# Patient Record
Sex: MALE | Race: WHITE | NOT HISPANIC OR LATINO
[De-identification: names, ages, dates, MRNs, and addresses within clinical notes are randomized per-mention and may not be internally consistent; named-entity substitution may affect disease eponyms.]

---

## 2017-01-19 ENCOUNTER — RX RENEWAL (OUTPATIENT)
Age: 67
End: 2017-01-19

## 2017-02-17 ENCOUNTER — APPOINTMENT (OUTPATIENT)
Dept: FAMILY MEDICINE | Facility: CLINIC | Age: 67
End: 2017-02-17

## 2017-02-17 VITALS
WEIGHT: 260 LBS | OXYGEN SATURATION: 93 % | BODY MASS INDEX: 38.51 KG/M2 | HEART RATE: 70 BPM | HEIGHT: 69 IN | DIASTOLIC BLOOD PRESSURE: 80 MMHG | SYSTOLIC BLOOD PRESSURE: 148 MMHG

## 2017-02-17 VITALS — SYSTOLIC BLOOD PRESSURE: 132 MMHG | DIASTOLIC BLOOD PRESSURE: 80 MMHG

## 2017-03-27 ENCOUNTER — NON-APPOINTMENT (OUTPATIENT)
Age: 67
End: 2017-03-27

## 2017-03-27 ENCOUNTER — APPOINTMENT (OUTPATIENT)
Dept: FAMILY MEDICINE | Facility: CLINIC | Age: 67
End: 2017-03-27

## 2017-03-27 VITALS
OXYGEN SATURATION: 94 % | HEART RATE: 72 BPM | HEIGHT: 69 IN | DIASTOLIC BLOOD PRESSURE: 68 MMHG | SYSTOLIC BLOOD PRESSURE: 132 MMHG | WEIGHT: 256 LBS | BODY MASS INDEX: 37.92 KG/M2

## 2017-03-27 DIAGNOSIS — Z87.898 PERSONAL HISTORY OF OTHER SPECIFIED CONDITIONS: ICD-10-CM

## 2017-03-27 DIAGNOSIS — K08.9 DISORDER OF TEETH AND SUPPORTING STRUCTURES, UNSPECIFIED: ICD-10-CM

## 2017-03-27 DIAGNOSIS — R09.82 POSTNASAL DRIP: ICD-10-CM

## 2017-03-27 LAB
25(OH)D3 SERPL-MCNC: 38
ALBUMIN SERPL ELPH-MCNC: 4.2
ALP BLD-CCNC: 74
ALT SERPL-CCNC: 35
APPEARANCE: CLEAR
AST SERPL-CCNC: 22
BACTERIA: NORMAL
BILIRUBIN URINE: NEGATIVE
BLOOD URINE: NEGATIVE
CALCIUM SERPL-MCNC: 9
CHLORIDE SERPL-SCNC: 102
CHOLEST SERPL-MCNC: 168
CHOLEST/HDLC SERPL: 3.5
CO2 SERPL-SCNC: 29
COLOR: YELLOW
CREAT SERPL-MCNC: 0.85
GLUCOSE QUALITATIVE U: NEGATIVE
GLUCOSE SERPL-MCNC: 106
HBA1C MFR BLD HPLC: 6
HCT VFR BLD CALC: 39.6
HDLC SERPL-MCNC: 48
HGB BLD-MCNC: 13.2
HYALINE CASTS: NORMAL
KETONES URINE: NEGATIVE
LDLC SERPL CALC-MCNC: 61
LEUKOCYTE ESTERASE URINE: NORMAL
NITRITE URINE: NEGATIVE
PH URINE: 6
PLATELET # BLD AUTO: 182
POTASSIUM SERPL-SCNC: 3.6
PROT SERPL-MCNC: 6.8
PROTEIN URINE: NEGATIVE
PSA SERPL-MCNC: 1.6
RED BLOOD CELLS URINE: NORMAL
SODIUM SERPL-SCNC: 141
SPECIFIC GRAVITY URINE: 1.01
SQUAMOUS EPITHELIAL CELLS: NORMAL
TESTOST BND SERPL-MCNC: 34
TESTOST SERPL-MCNC: 170
TRIGL SERPL-MCNC: 295
TSH SERPL-ACNC: 2.41
WBC # FLD AUTO: 4.7
WHITE BLOOD CELLS URINE: NORMAL

## 2017-03-27 RX ORDER — ASCORBIC ACID 500 MG
TABLET ORAL
Refills: 0 | Status: ACTIVE | COMMUNITY

## 2017-03-27 RX ORDER — ASPIRIN 81 MG
81 TABLET, DELAYED RELEASE (ENTERIC COATED) ORAL
Refills: 0 | Status: ACTIVE | COMMUNITY

## 2017-06-26 ENCOUNTER — RX RENEWAL (OUTPATIENT)
Age: 67
End: 2017-06-26

## 2017-06-29 ENCOUNTER — APPOINTMENT (OUTPATIENT)
Dept: FAMILY MEDICINE | Facility: CLINIC | Age: 67
End: 2017-06-29

## 2017-06-29 VITALS
DIASTOLIC BLOOD PRESSURE: 90 MMHG | SYSTOLIC BLOOD PRESSURE: 160 MMHG | BODY MASS INDEX: 38.21 KG/M2 | WEIGHT: 258 LBS | HEIGHT: 69 IN | HEART RATE: 74 BPM | OXYGEN SATURATION: 94 %

## 2017-06-29 DIAGNOSIS — L20.9 ATOPIC DERMATITIS, UNSPECIFIED: ICD-10-CM

## 2017-06-29 DIAGNOSIS — M25.50 PAIN IN UNSPECIFIED JOINT: ICD-10-CM

## 2017-06-29 DIAGNOSIS — M54.2 CERVICALGIA: ICD-10-CM

## 2017-06-29 RX ORDER — BACILLUS COAGULANS/INULIN 1B-250 MG
CAPSULE ORAL
Refills: 0 | Status: ACTIVE | COMMUNITY

## 2017-09-27 ENCOUNTER — RX RENEWAL (OUTPATIENT)
Age: 67
End: 2017-09-27

## 2017-09-29 ENCOUNTER — APPOINTMENT (OUTPATIENT)
Dept: FAMILY MEDICINE | Facility: CLINIC | Age: 67
End: 2017-09-29
Payer: MEDICARE

## 2017-09-29 VITALS
SYSTOLIC BLOOD PRESSURE: 122 MMHG | HEIGHT: 69 IN | DIASTOLIC BLOOD PRESSURE: 80 MMHG | TEMPERATURE: 97.8 F | OXYGEN SATURATION: 96 % | HEART RATE: 60 BPM | WEIGHT: 250 LBS | BODY MASS INDEX: 37.03 KG/M2

## 2017-09-29 DIAGNOSIS — Z23 ENCOUNTER FOR IMMUNIZATION: ICD-10-CM

## 2017-09-29 PROCEDURE — 90662 IIV NO PRSV INCREASED AG IM: CPT

## 2017-09-29 PROCEDURE — G0008: CPT

## 2017-09-29 PROCEDURE — 99214 OFFICE O/P EST MOD 30 MIN: CPT | Mod: 25

## 2017-10-16 ENCOUNTER — RX RENEWAL (OUTPATIENT)
Age: 67
End: 2017-10-16

## 2017-10-30 ENCOUNTER — RX RENEWAL (OUTPATIENT)
Age: 67
End: 2017-10-30

## 2017-10-31 ENCOUNTER — RX RENEWAL (OUTPATIENT)
Age: 67
End: 2017-10-31

## 2018-01-05 ENCOUNTER — APPOINTMENT (OUTPATIENT)
Dept: FAMILY MEDICINE | Facility: CLINIC | Age: 68
End: 2018-01-05
Payer: MEDICARE

## 2018-01-05 VITALS
HEART RATE: 64 BPM | WEIGHT: 235 LBS | BODY MASS INDEX: 34.8 KG/M2 | OXYGEN SATURATION: 98 % | DIASTOLIC BLOOD PRESSURE: 80 MMHG | SYSTOLIC BLOOD PRESSURE: 130 MMHG | HEIGHT: 69 IN

## 2018-01-05 DIAGNOSIS — Z87.09 PERSONAL HISTORY OF OTHER DISEASES OF THE RESPIRATORY SYSTEM: ICD-10-CM

## 2018-01-05 DIAGNOSIS — Z85.820 PERSONAL HISTORY OF MALIGNANT MELANOMA OF SKIN: ICD-10-CM

## 2018-01-05 PROCEDURE — 99214 OFFICE O/P EST MOD 30 MIN: CPT

## 2018-01-05 RX ORDER — CLOTRIMAZOLE AND BETAMETHASONE DIPROPIONATE 10; .5 MG/G; MG/G
1-0.05 CREAM TOPICAL TWICE DAILY
Qty: 1 | Refills: 3 | Status: DISCONTINUED | COMMUNITY
Start: 2017-06-29 | End: 2018-01-05

## 2018-03-19 ENCOUNTER — RX RENEWAL (OUTPATIENT)
Age: 68
End: 2018-03-19

## 2018-03-25 ENCOUNTER — RX RENEWAL (OUTPATIENT)
Age: 68
End: 2018-03-25

## 2018-04-11 ENCOUNTER — FORM ENCOUNTER (OUTPATIENT)
Age: 68
End: 2018-04-11

## 2018-04-11 DIAGNOSIS — M54.16 RADICULOPATHY, LUMBAR REGION: ICD-10-CM

## 2018-04-12 ENCOUNTER — APPOINTMENT (OUTPATIENT)
Dept: RADIOLOGY | Facility: CLINIC | Age: 68
End: 2018-04-12
Payer: MEDICARE

## 2018-04-12 ENCOUNTER — OUTPATIENT (OUTPATIENT)
Dept: OUTPATIENT SERVICES | Facility: HOSPITAL | Age: 68
LOS: 1 days | End: 2018-04-12
Payer: MEDICARE

## 2018-04-12 ENCOUNTER — OUTPATIENT (OUTPATIENT)
Dept: OUTPATIENT SERVICES | Facility: HOSPITAL | Age: 68
LOS: 1 days | End: 2018-04-12

## 2018-04-12 ENCOUNTER — APPOINTMENT (OUTPATIENT)
Dept: FAMILY MEDICINE | Facility: CLINIC | Age: 68
End: 2018-04-12
Payer: MEDICARE

## 2018-04-12 VITALS
OXYGEN SATURATION: 94 % | HEART RATE: 68 BPM | HEIGHT: 69 IN | SYSTOLIC BLOOD PRESSURE: 136 MMHG | WEIGHT: 230 LBS | BODY MASS INDEX: 34.07 KG/M2 | DIASTOLIC BLOOD PRESSURE: 78 MMHG

## 2018-04-12 DIAGNOSIS — M25.552 PAIN IN LEFT HIP: ICD-10-CM

## 2018-04-12 DIAGNOSIS — E55.9 VITAMIN D DEFICIENCY, UNSPECIFIED: ICD-10-CM

## 2018-04-12 DIAGNOSIS — M54.9 DORSALGIA, UNSPECIFIED: ICD-10-CM

## 2018-04-12 DIAGNOSIS — Z00.8 ENCOUNTER FOR OTHER GENERAL EXAMINATION: ICD-10-CM

## 2018-04-12 DIAGNOSIS — M25.551 PAIN IN RIGHT HIP: ICD-10-CM

## 2018-04-12 PROCEDURE — G0444 DEPRESSION SCREEN ANNUAL: CPT

## 2018-04-12 PROCEDURE — G0439: CPT

## 2018-04-12 PROCEDURE — 73502 X-RAY EXAM HIP UNI 2-3 VIEWS: CPT | Mod: 26,RT

## 2018-04-12 PROCEDURE — 72114 X-RAY EXAM L-S SPINE BENDING: CPT | Mod: 26

## 2018-04-12 PROCEDURE — 73502 X-RAY EXAM HIP UNI 2-3 VIEWS: CPT

## 2018-04-12 PROCEDURE — 72114 X-RAY EXAM L-S SPINE BENDING: CPT

## 2018-04-12 RX ORDER — PSYLLIUM HUSK 0.4 G
CAPSULE ORAL
Refills: 0 | Status: COMPLETED | COMMUNITY

## 2018-04-13 PROBLEM — M54.16 LUMBAR RADICULOPATHY, RIGHT: Status: ACTIVE | Noted: 2018-04-13

## 2018-04-17 ENCOUNTER — OTHER (OUTPATIENT)
Age: 68
End: 2018-04-17

## 2018-04-26 ENCOUNTER — FORM ENCOUNTER (OUTPATIENT)
Age: 68
End: 2018-04-26

## 2018-04-26 ENCOUNTER — RX RENEWAL (OUTPATIENT)
Age: 68
End: 2018-04-26

## 2018-04-27 ENCOUNTER — OUTPATIENT (OUTPATIENT)
Dept: OUTPATIENT SERVICES | Facility: HOSPITAL | Age: 68
LOS: 1 days | End: 2018-04-27
Payer: MEDICARE

## 2018-04-27 ENCOUNTER — APPOINTMENT (OUTPATIENT)
Dept: MRI IMAGING | Facility: CLINIC | Age: 68
End: 2018-04-27
Payer: MEDICARE

## 2018-04-27 DIAGNOSIS — Z00.8 ENCOUNTER FOR OTHER GENERAL EXAMINATION: ICD-10-CM

## 2018-04-27 PROCEDURE — 72148 MRI LUMBAR SPINE W/O DYE: CPT

## 2018-04-27 PROCEDURE — 72148 MRI LUMBAR SPINE W/O DYE: CPT | Mod: 26

## 2018-07-17 ENCOUNTER — RX RENEWAL (OUTPATIENT)
Age: 68
End: 2018-07-17

## 2018-08-13 ENCOUNTER — APPOINTMENT (OUTPATIENT)
Dept: FAMILY MEDICINE | Facility: CLINIC | Age: 68
End: 2018-08-13
Payer: MEDICARE

## 2018-08-13 VITALS
WEIGHT: 230 LBS | HEART RATE: 63 BPM | BODY MASS INDEX: 34.07 KG/M2 | OXYGEN SATURATION: 98 % | DIASTOLIC BLOOD PRESSURE: 80 MMHG | HEIGHT: 69 IN | SYSTOLIC BLOOD PRESSURE: 124 MMHG

## 2018-08-13 DIAGNOSIS — M25.532 PAIN IN LEFT WRIST: ICD-10-CM

## 2018-08-13 PROCEDURE — 99214 OFFICE O/P EST MOD 30 MIN: CPT

## 2018-08-13 NOTE — HISTORY OF PRESENT ILLNESS
[FreeTextEntry1] : 4 month f/u HLD/HTN [de-identified] : left wrist pain\par wrist hurt with any movement\par one day he felt a click and it stopped hurting\par \par still trying to limit alcohol\par \par

## 2018-09-21 ENCOUNTER — RX RENEWAL (OUTPATIENT)
Age: 68
End: 2018-09-21

## 2018-10-08 ENCOUNTER — APPOINTMENT (OUTPATIENT)
Dept: FAMILY MEDICINE | Facility: CLINIC | Age: 68
End: 2018-10-08
Payer: MEDICARE

## 2018-10-08 PROCEDURE — G0008: CPT

## 2018-10-08 PROCEDURE — 90662 IIV NO PRSV INCREASED AG IM: CPT

## 2018-10-18 ENCOUNTER — MEDICATION RENEWAL (OUTPATIENT)
Age: 68
End: 2018-10-18

## 2018-10-29 ENCOUNTER — RX RENEWAL (OUTPATIENT)
Age: 68
End: 2018-10-29

## 2018-11-26 ENCOUNTER — APPOINTMENT (OUTPATIENT)
Dept: FAMILY MEDICINE | Facility: CLINIC | Age: 68
End: 2018-11-26
Payer: MEDICARE

## 2018-11-26 VITALS
SYSTOLIC BLOOD PRESSURE: 128 MMHG | HEIGHT: 69 IN | HEART RATE: 65 BPM | DIASTOLIC BLOOD PRESSURE: 80 MMHG | OXYGEN SATURATION: 95 % | WEIGHT: 244 LBS | BODY MASS INDEX: 36.14 KG/M2

## 2018-11-26 VITALS — DIASTOLIC BLOOD PRESSURE: 82 MMHG | SYSTOLIC BLOOD PRESSURE: 136 MMHG

## 2018-11-26 PROCEDURE — G0447 BEHAVIOR COUNSEL OBESITY 15M: CPT | Mod: 59

## 2018-11-26 PROCEDURE — 36415 COLL VENOUS BLD VENIPUNCTURE: CPT

## 2018-11-26 PROCEDURE — G0442 ANNUAL ALCOHOL SCREEN 15 MIN: CPT | Mod: 59

## 2018-11-26 PROCEDURE — 99214 OFFICE O/P EST MOD 30 MIN: CPT | Mod: 25

## 2018-11-26 NOTE — COUNSELING
[ - Annual Alcohol Misuse Screening] : Annual Alcohol Misuse Screening [Weight management counseling provided] : Weight management [Healthy eating counseling provided] : healthy eating [Patient motivation] : Patient motivation [Good understanding] : Patient has a good understanding of lifestyle changes and the steps needed to achieve self management goals [de-identified] : advise decrease alcohol consumption\par given patients medical conditions advise less than 7 alcoholic beverages a week\par needs to focus on losing weight [ - Behavioral Counseling for Obesity (Face-to-Face for 15 Minutes)] : Behavioral Counseling for Obesity (Face-to-Face for 15 Minutes)

## 2018-11-26 NOTE — HISTORY OF PRESENT ILLNESS
[FreeTextEntry1] : follow up on HLD/HTN [de-identified] : stopped taking baby about a month ago\par was taking mobic in october but just for a few days\par he stopped everything at the same time\par he denies any heartburn\par he is not taking omeprazole at this time\par he does admit to increase alcohol consumtpion (more than 14 drinks per week) and blames his weight gain on the alcohol\par

## 2018-12-21 ENCOUNTER — RX RENEWAL (OUTPATIENT)
Age: 68
End: 2018-12-21

## 2019-01-09 LAB
HCT VFR BLD CALC: 40.5
HGB BLD-MCNC: 13.7
PLATELET # BLD AUTO: 193
WBC # FLD AUTO: 6

## 2019-01-14 ENCOUNTER — RX RENEWAL (OUTPATIENT)
Age: 69
End: 2019-01-14

## 2019-01-15 ENCOUNTER — RX RENEWAL (OUTPATIENT)
Age: 69
End: 2019-01-15

## 2019-01-16 ENCOUNTER — MEDICATION RENEWAL (OUTPATIENT)
Age: 69
End: 2019-01-16

## 2019-03-04 ENCOUNTER — APPOINTMENT (OUTPATIENT)
Dept: FAMILY MEDICINE | Facility: CLINIC | Age: 69
End: 2019-03-04
Payer: MEDICARE

## 2019-03-04 VITALS
HEIGHT: 69 IN | SYSTOLIC BLOOD PRESSURE: 132 MMHG | WEIGHT: 245 LBS | BODY MASS INDEX: 36.29 KG/M2 | OXYGEN SATURATION: 97 % | HEART RATE: 69 BPM | DIASTOLIC BLOOD PRESSURE: 78 MMHG

## 2019-03-04 PROCEDURE — G0447 BEHAVIOR COUNSEL OBESITY 15M: CPT | Mod: 59

## 2019-03-04 PROCEDURE — 99214 OFFICE O/P EST MOD 30 MIN: CPT | Mod: 25

## 2019-03-04 NOTE — COUNSELING
[Weight management counseling provided] : Weight management [Healthy eating counseling provided] : healthy eating [Activity counseling provided] : activity [Good understanding] : Patient has a good understanding of disease, goals and obesity follow-up plan [Decrease Portions] : Decrease food portions [ - Behavioral Counseling for Obesity (Face-to-Face for 15 Minutes)] : Behavioral Counseling for Obesity (Face-to-Face for 15 Minutes)

## 2019-03-24 ENCOUNTER — RX RENEWAL (OUTPATIENT)
Age: 69
End: 2019-03-24

## 2019-04-09 ENCOUNTER — RX RENEWAL (OUTPATIENT)
Age: 69
End: 2019-04-09

## 2019-06-06 ENCOUNTER — APPOINTMENT (OUTPATIENT)
Dept: FAMILY MEDICINE | Facility: CLINIC | Age: 69
End: 2019-06-06
Payer: MEDICARE

## 2019-06-06 VITALS
SYSTOLIC BLOOD PRESSURE: 144 MMHG | DIASTOLIC BLOOD PRESSURE: 82 MMHG | OXYGEN SATURATION: 96 % | HEIGHT: 69 IN | BODY MASS INDEX: 35.7 KG/M2 | HEART RATE: 66 BPM | WEIGHT: 241 LBS

## 2019-06-06 DIAGNOSIS — M79.645 PAIN IN LEFT FINGER(S): ICD-10-CM

## 2019-06-06 DIAGNOSIS — E66.9 OBESITY, UNSPECIFIED: ICD-10-CM

## 2019-06-06 PROCEDURE — G0439: CPT

## 2019-06-17 ENCOUNTER — FORM ENCOUNTER (OUTPATIENT)
Age: 69
End: 2019-06-17

## 2019-06-18 ENCOUNTER — APPOINTMENT (OUTPATIENT)
Dept: RADIOLOGY | Facility: CLINIC | Age: 69
End: 2019-06-18
Payer: MEDICARE

## 2019-06-18 ENCOUNTER — OUTPATIENT (OUTPATIENT)
Dept: OUTPATIENT SERVICES | Facility: HOSPITAL | Age: 69
LOS: 1 days | End: 2019-06-18
Payer: MEDICARE

## 2019-06-18 ENCOUNTER — APPOINTMENT (OUTPATIENT)
Dept: ULTRASOUND IMAGING | Facility: CLINIC | Age: 69
End: 2019-06-18
Payer: MEDICARE

## 2019-06-18 DIAGNOSIS — Z00.8 ENCOUNTER FOR OTHER GENERAL EXAMINATION: ICD-10-CM

## 2019-06-18 DIAGNOSIS — M79.645 PAIN IN LEFT FINGER(S): ICD-10-CM

## 2019-06-18 DIAGNOSIS — M67.40 GANGLION, UNSPECIFIED SITE: ICD-10-CM

## 2019-06-18 PROCEDURE — 73130 X-RAY EXAM OF HAND: CPT | Mod: 26,LT

## 2019-06-18 PROCEDURE — 76882 US LMTD JT/FCL EVL NVASC XTR: CPT | Mod: 26,LT

## 2019-06-18 PROCEDURE — 73110 X-RAY EXAM OF WRIST: CPT | Mod: 26,LT

## 2019-06-18 PROCEDURE — 76882 US LMTD JT/FCL EVL NVASC XTR: CPT

## 2019-06-18 PROCEDURE — 73130 X-RAY EXAM OF HAND: CPT

## 2019-06-18 PROCEDURE — 73110 X-RAY EXAM OF WRIST: CPT

## 2019-06-19 ENCOUNTER — CLINICAL ADVICE (OUTPATIENT)
Age: 69
End: 2019-06-19

## 2019-06-26 NOTE — HEALTH RISK ASSESSMENT
[No falls in past year] : Patient reported no falls in the past year [0] : 2) Feeling down, depressed, or hopeless: Not at all (0) [Good] : ~his/her~  mood as  good [] : No [[facility][id number]] : 0 [ColonoscopyDate] : 09/14

## 2019-06-26 NOTE — PHYSICAL EXAM
[No Acute Distress] : no acute distress [Well Nourished] : well nourished [Well Developed] : well developed [Normal Sclera/Conjunctiva] : normal sclera/conjunctiva [Well-Appearing] : well-appearing [PERRL] : pupils equal round and reactive to light [EOMI] : extraocular movements intact [Normal Outer Ear/Nose] : the outer ears and nose were normal in appearance [Normal Oropharynx] : the oropharynx was normal [No JVD] : no jugular venous distention [Supple] : supple [No Lymphadenopathy] : no lymphadenopathy [Thyroid Normal, No Nodules] : the thyroid was normal and there were no nodules present [No Respiratory Distress] : no respiratory distress  [No Accessory Muscle Use] : no accessory muscle use [Clear to Auscultation] : lungs were clear to auscultation bilaterally [Normal Rate] : normal rate  [Regular Rhythm] : with a regular rhythm [No Murmur] : no murmur heard [Normal S1, S2] : normal S1 and S2 [No Carotid Bruits] : no carotid bruits [No Abdominal Bruit] : a ~M bruit was not heard ~T in the abdomen [No Varicosities] : no varicosities [Pedal Pulses Present] : the pedal pulses are present [No Edema] : there was no peripheral edema [No Extremity Clubbing/Cyanosis] : no extremity clubbing/cyanosis [Soft] : abdomen soft [No Palpable Aorta] : no palpable aorta [Non Tender] : non-tender [Non-distended] : non-distended [No Masses] : no abdominal mass palpated [No HSM] : no HSM [Normal Bowel Sounds] : normal bowel sounds [Normal Posterior Cervical Nodes] : no posterior cervical lymphadenopathy [Normal Anterior Cervical Nodes] : no anterior cervical lymphadenopathy [No CVA Tenderness] : no CVA  tenderness [No Joint Swelling] : no joint swelling [No Spinal Tenderness] : no spinal tenderness [Grossly Normal Strength/Tone] : grossly normal strength/tone [No Rash] : no rash [Normal Gait] : normal gait [Coordination Grossly Intact] : coordination grossly intact [No Focal Deficits] : no focal deficits [Deep Tendon Reflexes (DTR)] : deep tendon reflexes were 2+ and symmetric [Normal Affect] : the affect was normal [Normal Insight/Judgement] : insight and judgment were intact

## 2019-07-18 ENCOUNTER — RX RENEWAL (OUTPATIENT)
Age: 69
End: 2019-07-18

## 2019-09-23 ENCOUNTER — RX RENEWAL (OUTPATIENT)
Age: 69
End: 2019-09-23

## 2019-10-04 ENCOUNTER — APPOINTMENT (OUTPATIENT)
Dept: FAMILY MEDICINE | Facility: CLINIC | Age: 69
End: 2019-10-04
Payer: MEDICARE

## 2019-10-04 VITALS
DIASTOLIC BLOOD PRESSURE: 80 MMHG | WEIGHT: 243 LBS | HEART RATE: 68 BPM | SYSTOLIC BLOOD PRESSURE: 132 MMHG | TEMPERATURE: 98.5 F | HEIGHT: 69 IN | OXYGEN SATURATION: 95 % | BODY MASS INDEX: 35.99 KG/M2

## 2019-10-04 PROCEDURE — 99214 OFFICE O/P EST MOD 30 MIN: CPT | Mod: 25

## 2019-10-04 PROCEDURE — 90662 IIV NO PRSV INCREASED AG IM: CPT

## 2019-10-04 PROCEDURE — G0008: CPT

## 2019-10-04 NOTE — HISTORY OF PRESENT ILLNESS
[Hyperlipidemia] : Hyperlipidemia [Hypertension] : Hypertension [Patient was last seen on ___] : Patient was last seen on [unfilled] [Checks BP Sporadically] : The patient checks ~his/her~ blood pressure sporadically [<130/90] : Target blood pressure is  <130/90 [Near target goal] : BP near target goal [Doing Well] : Patient is doing well [Managed with medications] : managed with  medication [FreeTextEntry6] : SHAUN is a 69 year male here for f/u. \par  [de-identified] : Amlodipine 5 mg, Lisinopril-HCTZ 20-12.5 mg, Metoprolol 50 mg  [FreeTextEntry1] : Atorvastatin 40 mg, Niacin 500 mg

## 2019-10-04 NOTE — ADDENDUM
[FreeTextEntry1] : I, Vivein Culp acting as a scribe for Dr. Annabella Ornelas on Oct 04, 2019  at 10:04 AM\par

## 2019-10-04 NOTE — DATA REVIEWED
[FreeTextEntry1] : Glucose 99\par Creatinine 0.82\par AST 22\par ALT 24\par Uric acid 5.9\par Total Cholesterol 149\par HDL 65\par LDL 66\par Triglycerides 101\par WBC 4.7\par Hemoglobin 13.7\par Hematocrit 41.2\par \par A1c 5.4%\par

## 2019-10-04 NOTE — END OF VISIT
[FreeTextEntry3] : Medical record entries made by the scribe today today, were at my direction and personally dictated to them by me, Dr. Annabella Ornelas on Oct 04, 2019. I have reviewed the chart and agree that the record accurately reflects my personal performance of the history, physical exam, assessment, and plan.\par \par

## 2019-10-04 NOTE — PHYSICAL EXAM
[No Acute Distress] : no acute distress [Well Nourished] : well nourished [Well Developed] : well developed [Well-Appearing] : well-appearing [Normal Sclera/Conjunctiva] : normal sclera/conjunctiva [PERRL] : pupils equal round and reactive to light [EOMI] : extraocular movements intact [Normal Outer Ear/Nose] : the outer ears and nose were normal in appearance [Normal Oropharynx] : the oropharynx was normal [No JVD] : no jugular venous distention [No Lymphadenopathy] : no lymphadenopathy [Supple] : supple [No Respiratory Distress] : no respiratory distress  [Thyroid Normal, No Nodules] : the thyroid was normal and there were no nodules present [No Accessory Muscle Use] : no accessory muscle use [Clear to Auscultation] : lungs were clear to auscultation bilaterally [Normal Rate] : normal rate  [Regular Rhythm] : with a regular rhythm [Normal S1, S2] : normal S1 and S2 [No Murmur] : no murmur heard [No Carotid Bruits] : no carotid bruits [No Abdominal Bruit] : a ~M bruit was not heard ~T in the abdomen [No Varicosities] : no varicosities [Pedal Pulses Present] : the pedal pulses are present [No Edema] : there was no peripheral edema [No Palpable Aorta] : no palpable aorta [No Extremity Clubbing/Cyanosis] : no extremity clubbing/cyanosis [Soft] : abdomen soft [Non Tender] : non-tender [Non-distended] : non-distended [No Masses] : no abdominal mass palpated [No HSM] : no HSM [Normal Posterior Cervical Nodes] : no posterior cervical lymphadenopathy [Normal Bowel Sounds] : normal bowel sounds [No CVA Tenderness] : no CVA  tenderness [Normal Anterior Cervical Nodes] : no anterior cervical lymphadenopathy [No Spinal Tenderness] : no spinal tenderness [No Joint Swelling] : no joint swelling [Grossly Normal Strength/Tone] : grossly normal strength/tone [Coordination Grossly Intact] : coordination grossly intact [No Rash] : no rash [Normal Gait] : normal gait [No Focal Deficits] : no focal deficits [Deep Tendon Reflexes (DTR)] : deep tendon reflexes were 2+ and symmetric [Normal Affect] : the affect was normal [Normal Insight/Judgement] : insight and judgment were intact

## 2020-01-08 ENCOUNTER — RX RENEWAL (OUTPATIENT)
Age: 70
End: 2020-01-08

## 2020-01-08 ENCOUNTER — APPOINTMENT (OUTPATIENT)
Dept: FAMILY MEDICINE | Facility: CLINIC | Age: 70
End: 2020-01-08
Payer: MEDICARE

## 2020-01-08 VITALS
HEIGHT: 69 IN | DIASTOLIC BLOOD PRESSURE: 82 MMHG | OXYGEN SATURATION: 97 % | SYSTOLIC BLOOD PRESSURE: 130 MMHG | HEART RATE: 70 BPM | BODY MASS INDEX: 36.14 KG/M2 | WEIGHT: 244 LBS

## 2020-01-08 DIAGNOSIS — R09.82 POSTNASAL DRIP: ICD-10-CM

## 2020-01-08 PROCEDURE — 99214 OFFICE O/P EST MOD 30 MIN: CPT

## 2020-01-08 NOTE — PLAN
[FreeTextEntry1] : - Blood work script \par - Encouraged physical activity\par - Encouraged to use Flonase \par - Reviewed risk/benefits/alternatives of the shingrix vaccine\par - Follow up in June for CPE\par \par

## 2020-01-08 NOTE — REVIEW OF SYSTEMS
[Postnasal Drip] : postnasal drip [Cough] : cough [Negative] : Heme/Lymph [FreeTextEntry4] : congestion

## 2020-01-08 NOTE — PHYSICAL EXAM
[No Acute Distress] : no acute distress [Well Nourished] : well nourished [Well Developed] : well developed [Well-Appearing] : well-appearing [PERRL] : pupils equal round and reactive to light [Normal Sclera/Conjunctiva] : normal sclera/conjunctiva [EOMI] : extraocular movements intact [Normal Outer Ear/Nose] : the outer ears and nose were normal in appearance [Normal Oropharynx] : the oropharynx was normal [No JVD] : no jugular venous distention [Supple] : supple [No Lymphadenopathy] : no lymphadenopathy [Thyroid Normal, No Nodules] : the thyroid was normal and there were no nodules present [No Respiratory Distress] : no respiratory distress  [No Accessory Muscle Use] : no accessory muscle use [Clear to Auscultation] : lungs were clear to auscultation bilaterally [Normal Rate] : normal rate  [Regular Rhythm] : with a regular rhythm [Normal S1, S2] : normal S1 and S2 [No Murmur] : no murmur heard [No Carotid Bruits] : no carotid bruits [No Abdominal Bruit] : a ~M bruit was not heard ~T in the abdomen [No Varicosities] : no varicosities [Pedal Pulses Present] : the pedal pulses are present [No Edema] : there was no peripheral edema [No Palpable Aorta] : no palpable aorta [No Extremity Clubbing/Cyanosis] : no extremity clubbing/cyanosis [Soft] : abdomen soft [Non Tender] : non-tender [Non-distended] : non-distended [No Masses] : no abdominal mass palpated [No HSM] : no HSM [Normal Bowel Sounds] : normal bowel sounds [Normal Posterior Cervical Nodes] : no posterior cervical lymphadenopathy [Normal Anterior Cervical Nodes] : no anterior cervical lymphadenopathy [No CVA Tenderness] : no CVA  tenderness [No Spinal Tenderness] : no spinal tenderness [No Joint Swelling] : no joint swelling [Grossly Normal Strength/Tone] : grossly normal strength/tone [No Rash] : no rash [Coordination Grossly Intact] : coordination grossly intact [No Focal Deficits] : no focal deficits [Normal Gait] : normal gait [Normal Affect] : the affect was normal [Deep Tendon Reflexes (DTR)] : deep tendon reflexes were 2+ and symmetric [Normal Insight/Judgement] : insight and judgment were intact

## 2020-01-08 NOTE — ADDENDUM
[FreeTextEntry1] : I, Vivien Culp acting as a scribe for Dr. Annabella Ornelas on Jan 08, 2020  at 10:16 AM\par

## 2020-01-08 NOTE — HISTORY OF PRESENT ILLNESS
[Hypertension] : Hypertension [Hyperlipidemia] : Hyperlipidemia [Patient was last seen on ___] : Patient was last seen on [unfilled] [Other: ___] : [unfilled] [Checks BP Sporadically] : The patient checks ~his/her~ blood pressure sporadically [Target goal met] : BP target goal met [<130/90] : Target blood pressure is  <130/90 [Stable] : Patient is stable [Managed with medications] : managed with  medication [FreeTextEntry6] : SHAUN is a 69 year male here for f/u.  [de-identified] : Metoprolol Tartrate 50 mg, Lisinopril-HCTZ 20-12.5 mg, Amlodipine 5 mg, ASA 81 mg [FreeTextEntry1] : Atorvastatin 40 mg, Niacin ER 1000 mg daily

## 2020-01-08 NOTE — END OF VISIT
[FreeTextEntry3] : Medical record entries made by the scribe today today, were at my direction and personally dictated to them by me, Dr. Annabella Ornelas on Jan 08, 2020. I have reviewed the chart and agree that the record accurately reflects my personal performance of the history, physical exam, assessment, and plan.\par \par

## 2020-01-13 ENCOUNTER — RX RENEWAL (OUTPATIENT)
Age: 70
End: 2020-01-13

## 2020-03-10 ENCOUNTER — RX RENEWAL (OUTPATIENT)
Age: 70
End: 2020-03-10

## 2020-03-23 ENCOUNTER — RX RENEWAL (OUTPATIENT)
Age: 70
End: 2020-03-23

## 2020-06-08 ENCOUNTER — APPOINTMENT (OUTPATIENT)
Dept: FAMILY MEDICINE | Facility: CLINIC | Age: 70
End: 2020-06-08
Payer: MEDICARE

## 2020-06-08 VITALS
OXYGEN SATURATION: 98 % | DIASTOLIC BLOOD PRESSURE: 80 MMHG | HEIGHT: 69 IN | HEART RATE: 75 BPM | TEMPERATURE: 98.8 F | WEIGHT: 251 LBS | BODY MASS INDEX: 37.18 KG/M2 | SYSTOLIC BLOOD PRESSURE: 138 MMHG

## 2020-06-08 VITALS — SYSTOLIC BLOOD PRESSURE: 140 MMHG | DIASTOLIC BLOOD PRESSURE: 80 MMHG

## 2020-06-08 DIAGNOSIS — E66.09 OTHER OBESITY DUE TO EXCESS CALORIES: ICD-10-CM

## 2020-06-08 DIAGNOSIS — R25.1 TREMOR, UNSPECIFIED: ICD-10-CM

## 2020-06-08 PROCEDURE — G0439: CPT

## 2020-06-08 NOTE — HEALTH RISK ASSESSMENT
[Good] : ~his/her~ current health as good [No falls in past year] : Patient reported no falls in the past year [Patient reported colonoscopy was normal] : Patient reported colonoscopy was normal [None] : None [With Family] : lives with family [Retired] : retired [] :  [Feels Safe at Home] : Feels safe at home [Fully functional (bathing, dressing, toileting, transferring, walking, feeding)] : Fully functional (bathing, dressing, toileting, transferring, walking, feeding) [Fully functional (using the telephone, shopping, preparing meals, housekeeping, doing laundry, using] : Fully functional and needs no help or supervision to perform IADLs (using the telephone, shopping, preparing meals, housekeeping, doing laundry, using transportation, managing medications and managing finances) [Fair] :  ~his/her~ mood as fair [de-identified] : Golf  [ColonoscopyDate] : 09/14

## 2020-06-08 NOTE — PHYSICAL EXAM
[No Acute Distress] : no acute distress [Well Nourished] : well nourished [Well Developed] : well developed [Well-Appearing] : well-appearing [Normal Sclera/Conjunctiva] : normal sclera/conjunctiva [PERRL] : pupils equal round and reactive to light [EOMI] : extraocular movements intact [Normal Oropharynx] : the oropharynx was normal [Normal Outer Ear/Nose] : the outer ears and nose were normal in appearance [No Lymphadenopathy] : no lymphadenopathy [No JVD] : no jugular venous distention [Thyroid Normal, No Nodules] : the thyroid was normal and there were no nodules present [Supple] : supple [No Respiratory Distress] : no respiratory distress  [No Accessory Muscle Use] : no accessory muscle use [Clear to Auscultation] : lungs were clear to auscultation bilaterally [Normal Rate] : normal rate  [Regular Rhythm] : with a regular rhythm [Normal S1, S2] : normal S1 and S2 [No Murmur] : no murmur heard [No Abdominal Bruit] : a ~M bruit was not heard ~T in the abdomen [No Carotid Bruits] : no carotid bruits [No Varicosities] : no varicosities [Pedal Pulses Present] : the pedal pulses are present [No Edema] : there was no peripheral edema [No Palpable Aorta] : no palpable aorta [No Extremity Clubbing/Cyanosis] : no extremity clubbing/cyanosis [Soft] : abdomen soft [Non Tender] : non-tender [Non-distended] : non-distended [No Masses] : no abdominal mass palpated [No HSM] : no HSM [Normal Posterior Cervical Nodes] : no posterior cervical lymphadenopathy [Normal Bowel Sounds] : normal bowel sounds [Normal Anterior Cervical Nodes] : no anterior cervical lymphadenopathy [No CVA Tenderness] : no CVA  tenderness [No Spinal Tenderness] : no spinal tenderness [No Joint Swelling] : no joint swelling [Grossly Normal Strength/Tone] : grossly normal strength/tone [No Rash] : no rash [Coordination Grossly Intact] : coordination grossly intact [No Focal Deficits] : no focal deficits [Deep Tendon Reflexes (DTR)] : deep tendon reflexes were 2+ and symmetric [Normal Gait] : normal gait [Normal Affect] : the affect was normal [Normal Insight/Judgement] : insight and judgment were intact

## 2020-06-08 NOTE — REASON FOR VISIT
[Annual Wellness Visit] : an annual wellness visit [FreeTextEntry1] : [facility] wellness exam

## 2020-06-08 NOTE — ADDENDUM
[FreeTextEntry1] : I, Vivien Culp acting as a scribe for Dr. Annabella Ornelas on Jun 08, 2020  at 10:52 AM\par

## 2020-06-08 NOTE — END OF VISIT
[FreeTextEntry3] : Medical record entries made by the scribe today today, were at my direction and personally dictated to them by me, Dr. Annabella Ornelas on Jun 08, 2020. I have reviewed the chart and agree that the record accurately reflects my personal performance of the history, physical exam, assessment, and plan.\par \par

## 2020-06-08 NOTE — PLAN
[FreeTextEntry1] : - Shaking likely Benign Essential Tremor\par - Follow up with Neurologist for formal dx, referral provided\par - Discussed decreasing EToH use\par - Encouraged weight loss \par - Follow up in 4 months

## 2020-06-08 NOTE — HISTORY OF PRESENT ILLNESS
[FreeTextEntry1] : annual wellness visit [de-identified] : SHAUN is a 69 year male here for [facility] annual wellness. Mood is fair. Currently taking Allopurinol 300 mg, Amlodipine 5 mg, ASA 81 mg, Atorvastatin 40 mg, Lisinopril-HCTZ 20-12.5 mg, Metoprolol Tartrate 100 mg, Niacin 1000 mg, Multivitamin daily. Pt reports 10 lb weight gain. Admits to increased alcohol intake (wine/vodka). Still playing golf frequently. Denies any falls in the past year. Reports there has been a few times of him turning around too fast and feeling off balance. Pt presents today c/o b/l hand shaking. Occurs with movement. \par Following up with Dermatologist 8/20. Follows up with GI .

## 2020-07-14 ENCOUNTER — RX RENEWAL (OUTPATIENT)
Age: 70
End: 2020-07-14

## 2020-09-16 ENCOUNTER — RX RENEWAL (OUTPATIENT)
Age: 70
End: 2020-09-16

## 2020-09-17 ENCOUNTER — APPOINTMENT (OUTPATIENT)
Dept: FAMILY MEDICINE | Facility: CLINIC | Age: 70
End: 2020-09-17
Payer: MEDICARE

## 2020-09-17 PROCEDURE — 90662 IIV NO PRSV INCREASED AG IM: CPT

## 2020-09-17 PROCEDURE — G0008: CPT

## 2020-10-08 ENCOUNTER — APPOINTMENT (OUTPATIENT)
Dept: FAMILY MEDICINE | Facility: CLINIC | Age: 70
End: 2020-10-08
Payer: MEDICARE

## 2020-10-08 ENCOUNTER — RX RENEWAL (OUTPATIENT)
Age: 70
End: 2020-10-08

## 2020-10-08 VITALS
HEIGHT: 69 IN | SYSTOLIC BLOOD PRESSURE: 142 MMHG | OXYGEN SATURATION: 97 % | TEMPERATURE: 98.4 F | BODY MASS INDEX: 36.58 KG/M2 | DIASTOLIC BLOOD PRESSURE: 80 MMHG | WEIGHT: 247 LBS | HEART RATE: 75 BPM

## 2020-10-08 VITALS — DIASTOLIC BLOOD PRESSURE: 82 MMHG | SYSTOLIC BLOOD PRESSURE: 144 MMHG

## 2020-10-08 DIAGNOSIS — J30.2 OTHER SEASONAL ALLERGIC RHINITIS: ICD-10-CM

## 2020-10-08 PROCEDURE — 99214 OFFICE O/P EST MOD 30 MIN: CPT

## 2020-10-08 NOTE — ADDENDUM
[FreeTextEntry1] : I, Martha Carter acting as a scribe for Dr. Annabella Ornelas on Oct 08, 2020  at 10:25 AM\par

## 2020-10-08 NOTE — PLAN
[FreeTextEntry1] : \par - Losartan dosage increased to twice a day \par - Discussed holding off on the shingles vaccine (R/B/A/side effects discussed)\par - Advised to monitor LE edema \par - Encouraged to use his nose spray consistently/ Flonase renewed \par - Discussed importance of CPAP machine \par - Amlodipine 5 mg renewed\par - Abdominal US ordered

## 2020-10-08 NOTE — HISTORY OF PRESENT ILLNESS
[FreeTextEntry1] : follow up on HTN [de-identified] : SHAUN is a 70 year male here for HTN follow up. States Dr. Bhakta informed him that he was retaining more water in his LE. Notes his Lisinopril-HCTZ 20-12.5 mg was changed to Losartan once a day by his nephrologist Dr. Spaulding. States it was changed so that him and his wife could be on the same medication. Has f/u appt in December. Has been monitoring his BP at home and states it ranges in the 140's. Pt c/o PND. Worse in the am. States he uses his nasal spray once in a while. Feels it is not effective. Admits to not using his CPAP machine during pandemic. Pt states abdominal area feels tight. States it feels like he has a band around his stomach. Does not feel painful.  \par

## 2020-10-08 NOTE — END OF VISIT
[FreeTextEntry3] : Medical record entries made by the scribe today today, were at my direction and personally dictated to them by me, Dr. Annabella Ornelas on Oct 08, 2020. I have reviewed the chart and agree that the record accurately reflects my personal performance of the history, physical exam, assessment, and plan.\par

## 2020-10-08 NOTE — ASSESSMENT
[FreeTextEntry1] : Spoke to Dr. Spaulding's office- Will increase Losartan dosage to twice a day until his f/u appt with Dr. Spaulding in December.

## 2020-10-18 ENCOUNTER — RX RENEWAL (OUTPATIENT)
Age: 70
End: 2020-10-18

## 2020-12-16 PROBLEM — Z87.09 HISTORY OF UPPER RESPIRATORY INFECTION: Status: RESOLVED | Noted: 2018-01-21 | Resolved: 2020-12-16

## 2021-01-11 ENCOUNTER — APPOINTMENT (OUTPATIENT)
Dept: FAMILY MEDICINE | Facility: CLINIC | Age: 71
End: 2021-01-11
Payer: MEDICARE

## 2021-01-11 VITALS
WEIGHT: 250 LBS | TEMPERATURE: 98.7 F | HEIGHT: 69 IN | DIASTOLIC BLOOD PRESSURE: 72 MMHG | OXYGEN SATURATION: 98 % | HEART RATE: 71 BPM | BODY MASS INDEX: 37.03 KG/M2 | SYSTOLIC BLOOD PRESSURE: 120 MMHG

## 2021-01-11 VITALS — DIASTOLIC BLOOD PRESSURE: 82 MMHG | SYSTOLIC BLOOD PRESSURE: 138 MMHG

## 2021-01-11 DIAGNOSIS — Z11.59 ENCOUNTER FOR SCREENING FOR OTHER VIRAL DISEASES: ICD-10-CM

## 2021-01-11 PROCEDURE — 99214 OFFICE O/P EST MOD 30 MIN: CPT

## 2021-01-11 NOTE — HISTORY OF PRESENT ILLNESS
[FreeTextEntry1] : follow up in HTN [de-identified] : SHAUN is a 70 year male here for f/u. Currently taking Allopurinol 300 mg, Amlodipine 5 mg, ASA 81 mg, Atorvastatin 40 mg, Losartan Potassium-HCTZ 50-12.5 mg x2,  Metoprolol Tartrate 50 mg, Niacin 500 mg daily. Following an alcohol free diet and is actively trying to lose weight. Followed up with Cardiologist  last week, was started on Eliquis due to going into AFib in office. ECHO/Carotid/Stress test done. GI discomfort resolved since last visit. Normal Abd US. \par Pt presents today c/o sneezing associated with RT nostril irritation, onset with use of CPAP machine. Denies cough/fever. Experiencing intermittent rhinorrhea/watery eyes/RT ear discomfort.

## 2021-01-11 NOTE — PLAN
[FreeTextEntry1] : - Advised to follow up with Nephrologist  \par - Continue weight loss efforts \par - Continue alcohol free efforts \par - Follow up in 3-4 months

## 2021-01-11 NOTE — ADDENDUM
[FreeTextEntry1] : I, Vivien Culp acting as a scribe for Dr. Annabella Ornelas on Jan 11, 2021  at 12:01 PM\par

## 2021-01-13 LAB
SARS-COV-2 IGG SERPL IA-ACNC: <0.1 INDEX
SARS-COV-2 IGG SERPL QL IA: NEGATIVE

## 2021-01-17 ENCOUNTER — RX RENEWAL (OUTPATIENT)
Age: 71
End: 2021-01-17

## 2021-01-18 ENCOUNTER — RX RENEWAL (OUTPATIENT)
Age: 71
End: 2021-01-18

## 2021-04-19 ENCOUNTER — RX RENEWAL (OUTPATIENT)
Age: 71
End: 2021-04-19

## 2021-06-15 ENCOUNTER — APPOINTMENT (OUTPATIENT)
Dept: FAMILY MEDICINE | Facility: CLINIC | Age: 71
End: 2021-06-15
Payer: MEDICARE

## 2021-06-15 VITALS
SYSTOLIC BLOOD PRESSURE: 110 MMHG | HEIGHT: 69 IN | TEMPERATURE: 97.6 F | BODY MASS INDEX: 31.7 KG/M2 | WEIGHT: 214 LBS | OXYGEN SATURATION: 98 % | DIASTOLIC BLOOD PRESSURE: 80 MMHG | HEART RATE: 68 BPM

## 2021-06-15 VITALS — SYSTOLIC BLOOD PRESSURE: 108 MMHG | DIASTOLIC BLOOD PRESSURE: 72 MMHG

## 2021-06-15 DIAGNOSIS — Z86.69 PERSONAL HISTORY OF OTHER DISEASES OF THE NERVOUS SYSTEM AND SENSE ORGANS: ICD-10-CM

## 2021-06-15 DIAGNOSIS — H53.9 UNSPECIFIED VISUAL DISTURBANCE: ICD-10-CM

## 2021-06-15 PROCEDURE — 36415 COLL VENOUS BLD VENIPUNCTURE: CPT

## 2021-06-15 PROCEDURE — G0439: CPT

## 2021-06-15 PROCEDURE — G0444 DEPRESSION SCREEN ANNUAL: CPT | Mod: 59

## 2021-06-15 RX ORDER — LOSARTAN POTASSIUM AND HYDROCHLOROTHIAZIDE 12.5; 5 MG/1; MG/1
50-12.5 TABLET ORAL DAILY
Qty: 90 | Refills: 1 | Status: DISCONTINUED | COMMUNITY
Start: 2021-06-15 | End: 2021-06-15

## 2021-06-15 RX ORDER — LOSARTAN POTASSIUM AND HYDROCHLOROTHIAZIDE 12.5; 5 MG/1; MG/1
50-12.5 TABLET ORAL TWICE DAILY
Qty: 180 | Refills: 1 | Status: DISCONTINUED | COMMUNITY
Start: 2020-10-08 | End: 2021-06-15

## 2021-06-15 NOTE — PHYSICAL EXAM
[No Acute Distress] : no acute distress [Well Nourished] : well nourished [Well Developed] : well developed [Well-Appearing] : well-appearing [Normal Sclera/Conjunctiva] : normal sclera/conjunctiva [PERRL] : pupils equal round and reactive to light [EOMI] : extraocular movements intact [Normal Outer Ear/Nose] : the outer ears and nose were normal in appearance [Normal Oropharynx] : the oropharynx was normal [No JVD] : no jugular venous distention [No Lymphadenopathy] : no lymphadenopathy [Supple] : supple [Thyroid Normal, No Nodules] : the thyroid was normal and there were no nodules present [No Respiratory Distress] : no respiratory distress  [No Accessory Muscle Use] : no accessory muscle use [Clear to Auscultation] : lungs were clear to auscultation bilaterally [Normal Rate] : normal rate  [Regular Rhythm] : with a regular rhythm [Normal S1, S2] : normal S1 and S2 [No Murmur] : no murmur heard [No Carotid Bruits] : no carotid bruits [No Abdominal Bruit] : a ~M bruit was not heard ~T in the abdomen [No Varicosities] : no varicosities [Pedal Pulses Present] : the pedal pulses are present [No Edema] : there was no peripheral edema [No Palpable Aorta] : no palpable aorta [No Extremity Clubbing/Cyanosis] : no extremity clubbing/cyanosis [Soft] : abdomen soft [Non Tender] : non-tender [Non-distended] : non-distended [No Masses] : no abdominal mass palpated [No HSM] : no HSM [Normal Posterior Cervical Nodes] : no posterior cervical lymphadenopathy [Normal Bowel Sounds] : normal bowel sounds [Normal Anterior Cervical Nodes] : no anterior cervical lymphadenopathy [No CVA Tenderness] : no CVA  tenderness [No Spinal Tenderness] : no spinal tenderness [No Joint Swelling] : no joint swelling [Grossly Normal Strength/Tone] : grossly normal strength/tone [No Rash] : no rash [Coordination Grossly Intact] : coordination grossly intact [No Focal Deficits] : no focal deficits [Normal Gait] : normal gait [Normal Affect] : the affect was normal [Deep Tendon Reflexes (DTR)] : deep tendon reflexes were 2+ and symmetric [Normal Insight/Judgement] : insight and judgment were intact

## 2021-07-20 ENCOUNTER — RX RENEWAL (OUTPATIENT)
Age: 71
End: 2021-07-20

## 2021-07-29 ENCOUNTER — APPOINTMENT (OUTPATIENT)
Dept: FAMILY MEDICINE | Facility: CLINIC | Age: 71
End: 2021-07-29
Payer: MEDICARE

## 2021-07-29 VITALS
HEIGHT: 69 IN | SYSTOLIC BLOOD PRESSURE: 126 MMHG | DIASTOLIC BLOOD PRESSURE: 78 MMHG | OXYGEN SATURATION: 99 % | WEIGHT: 210 LBS | HEART RATE: 54 BPM | TEMPERATURE: 98 F | BODY MASS INDEX: 31.1 KG/M2

## 2021-07-29 VITALS
BODY MASS INDEX: 31.1 KG/M2 | WEIGHT: 210 LBS | DIASTOLIC BLOOD PRESSURE: 74 MMHG | HEIGHT: 69 IN | SYSTOLIC BLOOD PRESSURE: 122 MMHG

## 2021-07-29 DIAGNOSIS — E87.6 HYPOKALEMIA: ICD-10-CM

## 2021-07-29 PROCEDURE — 99213 OFFICE O/P EST LOW 20 MIN: CPT

## 2021-07-29 NOTE — END OF VISIT
[FreeTextEntry3] : Medical record entries made by the scribe today today, were at my direction and personally dictated to them by me, Dr. Annabella Ornelas on Estrada 15, 2021. I have reviewed the chart and agree that the record accurately reflects my personal performance of the history, physical exam, assessment, and plan.\par

## 2021-07-29 NOTE — HISTORY OF PRESENT ILLNESS
[FreeTextEntry1] : annual wellness visit [de-identified] : SHAUN is a 71 year male here for [facility] annual wellness. Mood is good. Currently taking Allopurinol 300 mg, Amlodipine 5 mg, ASA 81 mg, Atorvastatin 40 mg, Losartan-HCTZ 50-12.5 mg, Metoprolol Tartrate 75 mg BID, Niacin 1000 mg, ELIQUIS ,Multivitamin daily. Pt reports he has been able to loose 36 Ibs by giving up alcohol, improving his diet, and exercise. Still playing golf frequently. States he had Cologuard done fall of 2020. Follows up with dermatology regularly. Reports he has joint pain related to arthritis and usually takes Tylenol for relief. Complaints of flushing from the Niacin. Also c/o lightheadedness that occurs with movement. Reports double vision. Notices it once in a while when using the computer or watching TV. Also states his vision has gone black a few times. \par

## 2021-07-29 NOTE — REASON FOR VISIT
[Annual Wellness Visit] : an annual wellness visit [FreeTextEntry1] : [facility] wellness exam

## 2021-07-29 NOTE — ADDENDUM
[FreeTextEntry1] : I, Latha Garsia, verify that that I acted solely as a scribe for Dr. Annabella Ornelas on this date, 07/29/2021.

## 2021-07-29 NOTE — ASSESSMENT
[FreeTextEntry1] : ASSESSMENT: Mr. SHAUN CARUSO is a 71 year old male presenting to the clinic today regarding a follow up.\par \par # PE/vitals obtained - normal\par - patient lost 4 lbs. since last visit\par \par # Reviewed patient's lifestyle changes:\par - going for daily walks after dinner\par \par # Reviewed most recent blood work:\par - Potassium improved\par - Improved kidney function\par - Triglycerides are stable

## 2021-07-29 NOTE — ADDENDUM
[FreeTextEntry1] : I, Martha Carter acting as a scribe for Dr. Annabella Ornelas on Estrada 15, 2021  at 10:38 AM\par

## 2021-07-29 NOTE — HEALTH RISK ASSESSMENT
[Patient reported colonoscopy was normal] : Patient reported colonoscopy was normal [Good] : ~his/her~  mood as  good [No falls in past year] : Patient reported no falls in the past year [None] : None [With Family] : lives with family [Retired] : retired [Feels Safe at Home] : Feels safe at home [] :  [Fully functional (bathing, dressing, toileting, transferring, walking, feeding)] : Fully functional (bathing, dressing, toileting, transferring, walking, feeding) [Fully functional (using the telephone, shopping, preparing meals, housekeeping, doing laundry, using] : Fully functional and needs no help or supervision to perform IADLs (using the telephone, shopping, preparing meals, housekeeping, doing laundry, using transportation, managing medications and managing finances) [No] : In the past 12 months have you used drugs other than those required for medical reasons? No [0] : 2) Feeling down, depressed, or hopeless: Not at all (0) [] : No [de-identified] : Golf  [[id number]] : 0 [ColonoscopyDate] : 10/20 [ColonoscopyComments] : Dr. Howe, (-)Cologuard fall 2020

## 2021-07-29 NOTE — PLAN
[FreeTextEntry1] : \par - Advised trying no-flush niacin\par - Hypokalemia: Advised Increasing potassium intake through diet\par - Rosuvastatin renewed \par - Lightheadedness likely secondary to low BP: D/c HCTZ, will remain on Losartan 50 mg once a day, amlodipine 5 mg once a day, and metoprolol per cardiology. \par - Vision problems: Recommending neurology and ophthalmology \par - RTC 1 month for BP check \par

## 2021-07-29 NOTE — HISTORY OF PRESENT ILLNESS
[FreeTextEntry1] : Follow up HLD/HTN [de-identified] : SHAUN CARUSO is a 71 year old male presenting to the clinic today for a follow up. Mood is good, appears well. States he is doing well with keeping off weight, reporting a weight loss goal of 175 lbs. Occasionally checks BP at home, reporting BP around 130/80. Notes that his lightheadedness has resolved. Still taking Aspirin. Going for walks every day after dinner, and sometimes in the mornings. 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 Still taking 2 Niacin tablets.\par \par Followed up with ophthalmologist regarding vision changes and cataracts. Ophthalmologist consulted with cardiologist, and after examination, recommended patient sees a neurologist. Patient also informed he needs cataract surgery, which is scheduled for September. Will follow up with neurologist in August.

## 2021-08-15 ENCOUNTER — NON-APPOINTMENT (OUTPATIENT)
Age: 71
End: 2021-08-15

## 2021-08-16 ENCOUNTER — APPOINTMENT (OUTPATIENT)
Dept: NEUROLOGY | Facility: CLINIC | Age: 71
End: 2021-08-16
Payer: MEDICARE

## 2021-08-16 VITALS
SYSTOLIC BLOOD PRESSURE: 124 MMHG | HEIGHT: 69 IN | DIASTOLIC BLOOD PRESSURE: 76 MMHG | BODY MASS INDEX: 31.1 KG/M2 | WEIGHT: 210 LBS

## 2021-08-16 PROCEDURE — 99204 OFFICE O/P NEW MOD 45 MIN: CPT

## 2021-08-16 RX ORDER — APIXABAN 5 MG/1
5 TABLET, FILM COATED ORAL
Qty: 180 | Refills: 0 | Status: ACTIVE | COMMUNITY
Start: 2021-03-11

## 2021-08-26 ENCOUNTER — APPOINTMENT (OUTPATIENT)
Dept: FAMILY MEDICINE | Facility: CLINIC | Age: 71
End: 2021-08-26
Payer: MEDICARE

## 2021-08-26 ENCOUNTER — NON-APPOINTMENT (OUTPATIENT)
Age: 71
End: 2021-08-26

## 2021-08-26 VITALS
BODY MASS INDEX: 30.81 KG/M2 | HEIGHT: 69 IN | WEIGHT: 208 LBS | DIASTOLIC BLOOD PRESSURE: 70 MMHG | TEMPERATURE: 98.2 F | SYSTOLIC BLOOD PRESSURE: 120 MMHG | OXYGEN SATURATION: 99 % | HEART RATE: 62 BPM

## 2021-08-26 VITALS — DIASTOLIC BLOOD PRESSURE: 76 MMHG | SYSTOLIC BLOOD PRESSURE: 110 MMHG

## 2021-08-26 PROCEDURE — 93000 ELECTROCARDIOGRAM COMPLETE: CPT | Mod: 59

## 2021-08-26 PROCEDURE — 99214 OFFICE O/P EST MOD 30 MIN: CPT | Mod: 25

## 2021-08-26 RX ORDER — LOSARTAN POTASSIUM 50 MG/1
50 TABLET, FILM COATED ORAL
Qty: 90 | Refills: 0 | Status: COMPLETED | COMMUNITY
Start: 2021-06-15 | End: 2021-08-26

## 2021-08-26 NOTE — ASSESSMENT
[Patient Optimized for Surgery] : Patient optimized for surgery [Modify anticoagulant treatment prior to procedure] : Modify anticoagulant treatment prior to procedure [Continue medications as is] : Continue current medications [Modify anti-platelet treatment prior to procedure] : Modify anti-platelet treatment prior to procedure [As per surgery] : as per surgery [No Further Testing Recommended] : no further testing recommended [FreeTextEntry4] : Reviewed hx with with patient, no contradictions. Currently taking Allopurinol 300 mg, amlodipine 5 mg, ASA 81 mg, Eliquis 5 mg, Losartan, Metoprolol 50 mg one and half, rosuvastatin 10 mg, Niacin 500 mg.

## 2021-08-26 NOTE — HISTORY OF PRESENT ILLNESS
[Atrial Fibrillation] : atrial fibrillation [No Adverse Anesthesia Reaction] : no adverse anesthesia reaction in self or family member [(Patient denies any chest pain, claudication, dyspnea on exertion, orthopnea, palpitations or syncope)] : Patient denies any chest pain, claudication, dyspnea on exertion, orthopnea, palpitations or syncope [Sleep Apnea] : sleep apnea [Aortic Stenosis] : no aortic stenosis [Coronary Artery Disease] : no coronary artery disease [Recent Myocardial Infarction] : no recent myocardial infarction [Implantable Device/Pacemaker] : no implantable device/pacemaker [Asthma] : no asthma [COPD] : no COPD [Smoker] : not a smoker [Chronic Anticoagulation] : no chronic anticoagulation [Chronic Kidney Disease] : no chronic kidney disease [Diabetes] : no diabetes [FreeTextEntry1] : Cataract surgery OS [FreeTextEntry2] : 09/07/2021 [FreeTextEntry3] : Dr. Arce  [FreeTextEntry4] : SHAUN is a 71 year male here for medical clearance. Pt will be undergoing cataract surgery OS on 9/7/2021 with Dr. Arce. Feels well today and has no acute complaints.

## 2021-08-26 NOTE — ADDENDUM
[FreeTextEntry1] : I, Martha Carter acting as a scribe for Dr. Annabella Ornelas on Aug 26, 2021  at 10:19 AM\par

## 2021-08-26 NOTE — END OF VISIT
[FreeTextEntry3] : Medical record entries made by the scribe today today, were at my direction and personally dictated to them by me, Dr. Annabella Ornelas on Aug 26, 2021. I have reviewed the chart and agree that the record accurately reflects my personal performance of the history, physical exam, assessment, and plan.\par

## 2021-08-26 NOTE — PLAN
[FreeTextEntry1] : \par - Pt cleared for procedure\par - Expectations reviewed with patient. All questions addressed.\par

## 2021-09-06 ENCOUNTER — RX RENEWAL (OUTPATIENT)
Age: 71
End: 2021-09-06

## 2021-09-13 ENCOUNTER — RX RENEWAL (OUTPATIENT)
Age: 71
End: 2021-09-13

## 2021-10-19 ENCOUNTER — RX RENEWAL (OUTPATIENT)
Age: 71
End: 2021-10-19

## 2021-10-29 ENCOUNTER — APPOINTMENT (OUTPATIENT)
Dept: FAMILY MEDICINE | Facility: CLINIC | Age: 71
End: 2021-10-29
Payer: MEDICARE

## 2021-10-29 VITALS
BODY MASS INDEX: 29.62 KG/M2 | WEIGHT: 200 LBS | OXYGEN SATURATION: 98 % | TEMPERATURE: 98.2 F | HEART RATE: 62 BPM | DIASTOLIC BLOOD PRESSURE: 80 MMHG | HEIGHT: 69 IN | SYSTOLIC BLOOD PRESSURE: 140 MMHG

## 2021-10-29 DIAGNOSIS — Z23 ENCOUNTER FOR IMMUNIZATION: ICD-10-CM

## 2021-10-29 PROCEDURE — 99214 OFFICE O/P EST MOD 30 MIN: CPT | Mod: 25

## 2021-10-29 PROCEDURE — 90662 IIV NO PRSV INCREASED AG IM: CPT

## 2021-10-29 PROCEDURE — G0008: CPT

## 2021-11-01 NOTE — ADDENDUM
[FreeTextEntry1] : I, Latha Garsia, verify that that I acted solely as a scribe for Dr. Annabella Ornelas on this date, 10/29/2021.

## 2021-11-01 NOTE — PLAN
[FreeTextEntry1] : # Blood work script - in office\par \par # Administered Fluzone 0.7 ML intramuscular injection \par # Encouraged patient to continue with healthy diet and exercise\par \par # Follow up in 8 months for EB

## 2021-11-01 NOTE — HISTORY OF PRESENT ILLNESS
[FreeTextEntry1] : Follow up  [de-identified] : SHAUN CARUSO is a 71 year old male presenting to the clinic today for a follow up. Mood is good, appears well. Patient does appear fatigued at time of visit but admits that he did not sleep well last night.\par \par Patient maintains a moderately active lifestyle. Plays golf regularly, despite the cold. Also like to go for walks in nature, though does not do so everyday. Making sure to monitor diet, contributing to weight loss. Still refraining from drinking alcohol. No complaints at this time. Also states that he followed up with urology and that his PSA level has actually lowered since last visit.\par \par Inquired regarding a bill he received for blood work for Vitamin D deficiency monitoring. At the time of visit patient recalls experiencing some unexplained fatigue and believes that is why the blood work included Vitamin D level monitoring.

## 2021-11-01 NOTE — END OF VISIT
[FreeTextEntry2] : This note was written by SAY HARE on 10/29/2021 , acting solely as a scribe for Dr. Annabella Ornelas MD. \par \par All medical record entries made by the scribe, SAY HARE, were at my, Dr. Annabella Ornelas MD, direction and personally dictated by me on 10/29/2021 . I have personally reviewed the chart and agree that the record accurately reflects my personal performance and care.

## 2021-11-01 NOTE — ASSESSMENT
[FreeTextEntry1] : ASSESSMENT: Mr. SHAUN CARUSO is a 71 year old male presenting to the clinic today regarding a follow up.\par \par # PE/vitals obtained - normal\par - Lungs clear to auscultation\par - BP stable: (110/80 as per physician)\par - patient lost additional 8 lbs. since last visit\par \par # Reviewed previous blood work:\par - triglycerides @ 79 mg/dL\par - LDL @ 42 mg/dL\par - Total cholesterol 45 mg/dL

## 2021-12-14 ENCOUNTER — RX RENEWAL (OUTPATIENT)
Age: 71
End: 2021-12-14

## 2021-12-15 ENCOUNTER — RX RENEWAL (OUTPATIENT)
Age: 71
End: 2021-12-15

## 2021-12-22 ENCOUNTER — RX RENEWAL (OUTPATIENT)
Age: 71
End: 2021-12-22

## 2022-02-18 ENCOUNTER — OUTPATIENT (OUTPATIENT)
Dept: OUTPATIENT SERVICES | Facility: HOSPITAL | Age: 72
LOS: 1 days | End: 2022-02-18
Payer: MEDICARE

## 2022-02-18 ENCOUNTER — APPOINTMENT (OUTPATIENT)
Dept: RADIOLOGY | Facility: CLINIC | Age: 72
End: 2022-02-18
Payer: MEDICARE

## 2022-02-18 ENCOUNTER — APPOINTMENT (OUTPATIENT)
Dept: FAMILY MEDICINE | Facility: CLINIC | Age: 72
End: 2022-02-18
Payer: MEDICARE

## 2022-02-18 VITALS
HEIGHT: 69 IN | SYSTOLIC BLOOD PRESSURE: 108 MMHG | WEIGHT: 196 LBS | BODY MASS INDEX: 29.03 KG/M2 | DIASTOLIC BLOOD PRESSURE: 76 MMHG | HEART RATE: 57 BPM | TEMPERATURE: 97.6 F | OXYGEN SATURATION: 98 %

## 2022-02-18 DIAGNOSIS — M75.41 IMPINGEMENT SYNDROME OF RIGHT SHOULDER: ICD-10-CM

## 2022-02-18 PROCEDURE — 73030 X-RAY EXAM OF SHOULDER: CPT | Mod: 26,RT

## 2022-02-18 PROCEDURE — 73030 X-RAY EXAM OF SHOULDER: CPT

## 2022-02-18 PROCEDURE — 99214 OFFICE O/P EST MOD 30 MIN: CPT

## 2022-02-21 NOTE — PHYSICAL EXAM
[No Acute Distress] : no acute distress [Well Nourished] : well nourished [Well Developed] : well developed [Well-Appearing] : well-appearing [Normal Sclera/Conjunctiva] : normal sclera/conjunctiva [PERRL] : pupils equal round and reactive to light [EOMI] : extraocular movements intact [Normal Outer Ear/Nose] : the outer ears and nose were normal in appearance [Normal Oropharynx] : the oropharynx was normal [No JVD] : no jugular venous distention [No Lymphadenopathy] : no lymphadenopathy [Supple] : supple [Thyroid Normal, No Nodules] : the thyroid was normal and there were no nodules present [No Respiratory Distress] : no respiratory distress  [No Accessory Muscle Use] : no accessory muscle use [Clear to Auscultation] : lungs were clear to auscultation bilaterally [Normal Rate] : normal rate  [Regular Rhythm] : with a regular rhythm [Normal S1, S2] : normal S1 and S2 [No Murmur] : no murmur heard [No Carotid Bruits] : no carotid bruits [No Abdominal Bruit] : a ~M bruit was not heard ~T in the abdomen [No Varicosities] : no varicosities [Pedal Pulses Present] : the pedal pulses are present [No Edema] : there was no peripheral edema [No Palpable Aorta] : no palpable aorta [No Extremity Clubbing/Cyanosis] : no extremity clubbing/cyanosis [Soft] : abdomen soft [Non Tender] : non-tender [Non-distended] : non-distended [No Masses] : no abdominal mass palpated [No HSM] : no HSM [Normal Bowel Sounds] : normal bowel sounds [Normal Posterior Cervical Nodes] : no posterior cervical lymphadenopathy [Normal Anterior Cervical Nodes] : no anterior cervical lymphadenopathy [No CVA Tenderness] : no CVA  tenderness [No Spinal Tenderness] : no spinal tenderness [No Joint Swelling] : no joint swelling [Grossly Normal Strength/Tone] : grossly normal strength/tone [No Rash] : no rash [Coordination Grossly Intact] : coordination grossly intact [No Focal Deficits] : no focal deficits [Normal Gait] : normal gait [Deep Tendon Reflexes (DTR)] : deep tendon reflexes were 2+ and symmetric [Normal Affect] : the affect was normal [Normal Insight/Judgement] : insight and judgment were intact [de-identified] : Possible inpingment syndrome

## 2022-02-21 NOTE — END OF VISIT
[FreeTextEntry3] : Medical record entries made by the scribe today today, were at my direction and personally dictated to them by me, Dr. Annabella Ornelas on Feb 18, 2022. I have reviewed the chart and agree that the record accurately reflects my personal performance of the history, physical exam, assessment, and plan.\par

## 2022-02-21 NOTE — REVIEW OF SYSTEMS
[Joint Pain] : joint pain [Muscle Pain] : muscle pain [Negative] : Heme/Lymph [FreeTextEntry9] : hpi

## 2022-02-21 NOTE — ADDENDUM
[FreeTextEntry1] : I, Blair Portillo acting as a scribe for Dr. Annabella Ornelas on February 18, 2022.\par

## 2022-02-21 NOTE — HISTORY OF PRESENT ILLNESS
[FreeTextEntry8] : Shashi a 71 year male here today for chronic right shoulder pain x months. Pt c/o progressive intermittent joint and muscle pain with limited movement shoulder movement  x few weeks. Patient stated his pain his muscle pain is associated with movement and his join paint comes and goes. Patient denies weakness, numbness, or paresthesia. 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 Patient reported he can hold things normally. Patient denies pain to touch. Patient stated he has received no relief from OTC meds. Patient noted he had fallen in Fall 2021 and landed on his hands. Patient also noted hx of right shoulder injury from falling off [employer] years ago. Pt stated years ago he felt a pop in his shoulder while grabbing something from his closet but resolved the issue. Patient stated he has his own physical therapist. Pt also reported a bug bite that had caused burning sensation.\par Pt inquired about Inspire device for CPAP alternative.\par \par flu shot - 10/29/21 R. arm \par booster - 11/12/21 R.arm \par

## 2022-03-07 ENCOUNTER — RX RENEWAL (OUTPATIENT)
Age: 72
End: 2022-03-07

## 2022-04-02 ENCOUNTER — OUTPATIENT (OUTPATIENT)
Dept: OUTPATIENT SERVICES | Facility: HOSPITAL | Age: 72
LOS: 1 days | End: 2022-04-02
Payer: MEDICARE

## 2022-04-02 ENCOUNTER — APPOINTMENT (OUTPATIENT)
Dept: MRI IMAGING | Facility: CLINIC | Age: 72
End: 2022-04-02
Payer: MEDICARE

## 2022-04-02 DIAGNOSIS — M25.511 PAIN IN RIGHT SHOULDER: ICD-10-CM

## 2022-04-02 PROCEDURE — 73221 MRI JOINT UPR EXTREM W/O DYE: CPT | Mod: ME

## 2022-04-02 PROCEDURE — G1004: CPT

## 2022-04-02 PROCEDURE — 73221 MRI JOINT UPR EXTREM W/O DYE: CPT | Mod: 26,RT,ME

## 2022-04-11 PROBLEM — Z11.59 SCREENING FOR VIRAL DISEASE: Status: ACTIVE | Noted: 2021-01-11

## 2022-04-28 ENCOUNTER — NON-APPOINTMENT (OUTPATIENT)
Age: 72
End: 2022-04-28

## 2022-04-28 ENCOUNTER — APPOINTMENT (OUTPATIENT)
Age: 72
End: 2022-04-28
Payer: MEDICARE

## 2022-04-28 VITALS
HEIGHT: 69 IN | RESPIRATION RATE: 16 BRPM | HEART RATE: 69 BPM | SYSTOLIC BLOOD PRESSURE: 142 MMHG | BODY MASS INDEX: 28.14 KG/M2 | DIASTOLIC BLOOD PRESSURE: 78 MMHG | WEIGHT: 190 LBS

## 2022-04-28 PROCEDURE — 99204 OFFICE O/P NEW MOD 45 MIN: CPT

## 2022-04-29 NOTE — DISCUSSION/SUMMARY
[de-identified] : I had a lengthy discussion with the patient regarding their current condition. We discussed the treatment options including operative and nonoperative management. At this time I recommended conservative management.  We discussed the treatments for osteoarthritis of the shoulder including physical therapy, anti-inflammatories, moist heat, cryotherapy, cortisone injection, HA injection, and ultimately total shoulder arthroplasty.  Patient is not able to take oral NSAIDs due to his anticoagulant use.  His primary care doctor recommended Voltaren gel and I would agree with this.  He would like to review this with his cardiologist which I feel is reasonable before starting.  A prescription was sent to the pharmacy.  We discussed a cortisone injection, he will hold off for now.  We discussed total shoulder arthroplasty as well.  The risk benefits and alternatives of this were reviewed at length.  He would like to avoid this if possible.  He will follow-up with us as his symptoms dictate.  His daughter is getting  in August and he may wish to have a cortisone injection before this. In terms of PT, if he notices no progression with PT then he may d/c it.  All of his questions were answered.\par \par

## 2022-04-29 NOTE — ADDENDUM
[FreeTextEntry1] : Documented by Shine Enrique acting as a scribe for Dr. Serrato and Lavelle Stratton PA-C on 04/28/2022. \par \par All medical record entries made by the Scribe were at my, Lavelle Stratton's, direction and\par personally dictated by me on 04/28/2022. I have reviewed the chart and agree that the record\par accurately reflects my personal performance of the history, physical exam, procedure and imaging.

## 2022-04-29 NOTE — HISTORY OF PRESENT ILLNESS
[Worsening] : worsening [___ yrs] : [unfilled] year(s) ago [3] : a current pain level of 3/10 [5] : an average pain level of 5/10 [Lifting] : worsened by lifting [None] : No relieving factors are noted [de-identified] : SHAUN CARUSO is a 71 year y/o male who presents for initial visit of Right shoulder pain.  He is an avid golfer.  He is complaining of a couple years of right shoulder pain that is progressively worsening.  He was able to play 18 holes this morning.  He was referred by his primary care doctor, Dr. Ornelas.  He takes Eliquis for A. fib.  He has attended a few sessions of physical therapy which he states may have provided mild relief.  He describes his pain as an annoyance.  He denies paresthesias.  Has a history of a right total knee arthroplasty by Dr. akbar.

## 2022-04-29 NOTE — PHYSICAL EXAM
[de-identified] : Physical Exam: \par General: Well appearing, no acute distress \par Neurologic: A&Ox3, No focal deficits \par Head: NCAT without abrasions, lacerations, or ecchymosis to head, face, or scalp \par Eyes: No scleral icterus, no gross abnormalities \par Respiratory: Equal chest wall expansion bilaterally, no accessory muscle use \par Lymphatic: No lymphadenopathy palpated \par Skin: Warm and dry \par Psychiatric: Normal mood and affect\par \par Examination of the Right shoulder shows no obvious deformity, swelling or erythema. Mild tenderness to palpation over the anterior shoulder. No AC joint tenderness. The patient demonstrates active/passive ROM of Forward Flexion to 165 degrees, External Rotation to 30 degrees and Internal Rotation to a mid lumbar level. The patient has a positive Dahl and Neers test. No pain with cross body adduction, lift off testing, AC compression testing or Yergason testing. The patient has 4/5 strength to forward flexion with pronation, internal and external rotation. Compartments are soft and nontender. The patient has 2+ cap refill and sensation is intact in the hand. \par \par Left shoulder shows no deformity. No tenderness to palpation over the biceps or AC joint. The patient has Forward Flexion to 170 degrees, External Rotation to 45 degrees and Internal Rotation to a mid lumbar level. 5/5 strength to forward flexion with pronation, internal and external rotation. Compartments are soft and nontender. 2+ cap refill. Sensation intact distally.\par  [de-identified] : EXAM: [id number] - XR SHOULDER COMP MIN 2V RT - ORDERED BY: MITCH PRECIADO\par PROCEDURE DATE: 02/18/2022\par IMPRESSION:\par No fractures, dislocations, or AC separation.\par \par Mild AC moderate glenohumeral joint osteoarthrosis.\par \par Cortical cystic changes along humeral head anatomic neck region could correlate with degree of underlying rotator cuff abnormality, MRI may be helpful to further assess in this regard.\par \par No additional focal osseous lesions.\par \par No periarticular soft tissue calcifications.\par \par \par EXAM: [id number] - MR SHOULDER RT - ORDERED BY: MITCH PRECIADO\par PROCEDURE DATE: 04/02/2022\par IMPRESSION:\par \par Severe glenohumeral arthrosis.\par \par Moderate supraspinatus tendinosis with mild intrasubstance tearing at the insertion of the anterior third of the tendon.\par \par Moderate glenohumeral joint effusion with debris.

## 2022-06-02 ENCOUNTER — RX RENEWAL (OUTPATIENT)
Age: 72
End: 2022-06-02

## 2022-06-07 ENCOUNTER — APPOINTMENT (OUTPATIENT)
Dept: FAMILY MEDICINE | Facility: CLINIC | Age: 72
End: 2022-06-07
Payer: MEDICARE

## 2022-06-07 VITALS
BODY MASS INDEX: 27.25 KG/M2 | DIASTOLIC BLOOD PRESSURE: 72 MMHG | WEIGHT: 184 LBS | HEIGHT: 69 IN | HEART RATE: 62 BPM | TEMPERATURE: 98.2 F | SYSTOLIC BLOOD PRESSURE: 114 MMHG | OXYGEN SATURATION: 98 %

## 2022-06-07 PROCEDURE — G0439: CPT

## 2022-06-07 NOTE — PLAN
[FreeTextEntry1] : - Script provided for: Bw outside of office\par - RENEW: Rosuvastatin Calcium 10 MG Oral Tablet; Take 1 Tablet Every Day \par - C/w prescribed medication regimen\par - Enc to c/w physical activity regimen\par - Adv to take precautionary measures to prevent potential falls\par - F/u in 6 months or sooner as needed\par

## 2022-06-07 NOTE — ASSESSMENT
[FreeTextEntry1] : # RT Shoulder Pain\par - Monitored by Ortho\par - Compliantly uses Diclofenac Sodium 1 % External Gel\par \par # A Fib\par - Stable\par - C/w Eliquis 5 MG\par \par # HLD\par - Renewed on Rosuvastatin Calcium 10 MG \par \par # HTN\par - BP: 130/80, LUE, Sitting\par - C/w Amlodipine Besylate 5 MG\par - C/w Losartan Potassium 50 MG\par - C/w Metoprolol Succinate ER 50 MG \par - Pt will b f/u with cardio in July\par \par # Health Maintenance\par - Bw script provided\par - C/w undergoing regular physical acitivity\par

## 2022-06-07 NOTE — HEALTH RISK ASSESSMENT
[Patient reported colonoscopy was normal] : Patient reported colonoscopy was normal [Good] : ~his/her~  mood as  good [Never (0 pts)] : Never (0 points) [No] : In the past 12 months have you used drugs other than those required for medical reasons? No [One fall no injury in past year] : Patient reported one fall in the past year without injury [0] : 2) Feeling down, depressed, or hopeless: Not at all (0) [PHQ-2 Negative - No further assessment needed] : PHQ-2 Negative - No further assessment needed [None] : None [With Family] : lives with family [Retired] : retired [] :  [Feels Safe at Home] : Feels safe at home [Fully functional (bathing, dressing, toileting, transferring, walking, feeding)] : Fully functional (bathing, dressing, toileting, transferring, walking, feeding) [Fully functional (using the telephone, shopping, preparing meals, housekeeping, doing laundry, using] : Fully functional and needs no help or supervision to perform IADLs (using the telephone, shopping, preparing meals, housekeeping, doing laundry, using transportation, managing medications and managing finances) [Audit-CScore] : 0 [[id number]] : 0 [ColonoscopyDate] : 10/20 [ColonoscopyComments] : Cologuard Negative repeat 3 years 10/2023

## 2022-06-07 NOTE — ADDENDUM
[FreeTextEntry1] : I, Blair Portillo acting as a scribe for Dr. Annabella Ornelas on June 7, 2022.\par

## 2022-06-07 NOTE — END OF VISIT
[FreeTextEntry3] : Medical record entries made by the scribe today today, were at my direction and personally dictated to them by me, Dr. Annabella Ornelas on June 7, 2022. I have reviewed the chart and agree that the record accurately reflects my personal performance of the history, physical exam, assessment, and plan.\par

## 2022-06-07 NOTE — HISTORY OF PRESENT ILLNESS
[de-identified] : Shashi is a 71 year male here today for EB. Patient's mood is good and overall health seems to be well. Denies any acute complaints today. Pt confirmed he still golfs consistently despite RT shoulder pain. 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 Pt stated he takes Vitamin D3 supplement in the mornings regularly. Pt is currently not drinking any alcohol. Pt reported he needs a refill on Rosuvastatin Calcium 10 MG. Compliantly takes Eliquis 5 MG.\par \par Pt reported he saw ortho Dr. Serrato for management of RT shoulder pain. Pt stated he was prescribed Voltaren gel which has provided some relief. Stated he is currently thinking about shoulder replacement as he is a candidate as per ortho. Confirmed he is followed by cardio Dr. Garrett and will be f/u in July. Pt stated he usually undergoes full routine cardiac workup every other year though he was advised to go for workup this year due to arrhythmia.  Pt reported he had fallen in the past Winter while hiking in the woods. \par \par Pt stated he has received 2 doses of COVID-19 vaccination and 1 booster. Does not know if he will be going for a 2nd booster.

## 2022-06-12 ENCOUNTER — RX RENEWAL (OUTPATIENT)
Age: 72
End: 2022-06-12

## 2022-09-09 ENCOUNTER — RX RENEWAL (OUTPATIENT)
Age: 72
End: 2022-09-09

## 2022-09-12 ENCOUNTER — RX RENEWAL (OUTPATIENT)
Age: 72
End: 2022-09-12

## 2022-10-03 ENCOUNTER — RX RENEWAL (OUTPATIENT)
Age: 72
End: 2022-10-03

## 2022-10-06 ENCOUNTER — RX RENEWAL (OUTPATIENT)
Age: 72
End: 2022-10-06

## 2022-10-06 RX ORDER — DICLOFENAC SODIUM 1% 10 MG/G
1 GEL TOPICAL
Qty: 200 | Refills: 0 | Status: ACTIVE | COMMUNITY
Start: 2022-04-28 | End: 1900-01-01

## 2022-10-24 ENCOUNTER — APPOINTMENT (OUTPATIENT)
Dept: FAMILY MEDICINE | Facility: CLINIC | Age: 72
End: 2022-10-24

## 2022-10-24 PROCEDURE — 90662 IIV NO PRSV INCREASED AG IM: CPT

## 2022-10-24 PROCEDURE — G0008: CPT

## 2022-12-05 ENCOUNTER — APPOINTMENT (OUTPATIENT)
Dept: ORTHOPEDIC SURGERY | Facility: CLINIC | Age: 72
End: 2022-12-05

## 2022-12-05 VITALS — BODY MASS INDEX: 27.7 KG/M2 | WEIGHT: 187 LBS | HEIGHT: 69 IN

## 2022-12-05 PROCEDURE — 99214 OFFICE O/P EST MOD 30 MIN: CPT

## 2022-12-05 NOTE — ADDENDUM
[FreeTextEntry1] : Documented by Bruna Elder acting as a scribe for Dr. Serrato and Lavelle Stratton PA-C on 12/05/2022.   All medical record entries made by the Scribe were at my, Dr. Serrato, and Lavelle Stratton's, direction and personally dictated by me on 12/05/2022. I have reviewed the chart and agree that the record accurately reflects my personal performance of the history, physical exam, procedure and imaging.

## 2022-12-05 NOTE — HISTORY OF PRESENT ILLNESS
[de-identified] : SHAUN CARUSO is a 72 year male being seen for f/u visit R shoulder pain. He is frustrated with his continued pain and presents today for other treatment options. He has been using voltaren cream with some relief. Denies paresthesias. [Worsening] : worsening

## 2022-12-05 NOTE — DISCUSSION/SUMMARY
[de-identified] : We had a thorough discussion regarding the nature of his pain, the pathophysiology, as well as all treatment options. I discussed operative and non-operative treatment modalities. He has primary osteoarthritis of the right shoulder. \par \par Considering the patient's current presentation of pain, physical exam, and radiographs a CT is indicated at this time. A prescription for this was given to the patient. We will go over the CT results with him upon obtaining the results in the office and advise him of further treatment options. He agrees with the above plan and all questions were answered. \par \par All risks, benefits and alternatives to the proposed surgical procedure, reverse shoulder replacement, were discussed in great detail with the patient. Risks include but are not limited to pain, bleeding, infection, stiffness, medical complications (including DVT, PE, MI), and risks of anesthesia. The patient expressed understanding and all questions were answered. The patient is electing to proceed, and will have the patient scheduled accordingly. I provided him contact number of my surgical coordinator Fred, who will go over dates for this procedure. All questions were answered. \par \par

## 2022-12-05 NOTE — PHYSICAL EXAM
[de-identified] : Physical Exam: \par General: Well appearing, no acute distress \par Neurologic: A&Ox3, No focal deficits \par Head: NCAT without abrasions, lacerations, or ecchymosis to head, face, or scalp \par Eyes: No scleral icterus, no gross abnormalities \par Respiratory: Equal chest wall expansion bilaterally, no accessory muscle use \par Lymphatic: No lymphadenopathy palpated \par Skin: Warm and dry \par Psychiatric: Normal mood and affect\par \par Examination of the Right shoulder shows no obvious deformity, swelling or erythema. Mild tenderness to palpation over the anterior shoulder. No AC joint tenderness. The patient demonstrates active/passive ROM of Forward Flexion to 165 degrees, External Rotation to 30 degrees and Internal Rotation to a mid lumbar level. The patient has a positive Dahl and Neers test. No pain with cross body adduction, lift off testing, AC compression testing or Yergason testing. The patient has 4/5 strength to forward flexion with pronation, internal and external rotation. Compartments are soft and nontender. The patient has 2+ cap refill and sensation is intact in the hand. \par \par Left shoulder shows no deformity. No tenderness to palpation over the biceps or AC joint. The patient has Forward Flexion to 170 degrees, External Rotation to 45 degrees and Internal Rotation to a mid lumbar level. 5/5 strength to forward flexion with pronation, internal and external rotation. Compartments are soft and nontender. 2+ cap refill. Sensation intact distally.\par  [de-identified] : No new imaging done today.

## 2022-12-08 ENCOUNTER — APPOINTMENT (OUTPATIENT)
Dept: FAMILY MEDICINE | Facility: CLINIC | Age: 72
End: 2022-12-08

## 2022-12-08 VITALS
HEART RATE: 52 BPM | HEIGHT: 69 IN | DIASTOLIC BLOOD PRESSURE: 76 MMHG | TEMPERATURE: 97.9 F | OXYGEN SATURATION: 96 % | WEIGHT: 194 LBS | BODY MASS INDEX: 28.73 KG/M2 | SYSTOLIC BLOOD PRESSURE: 130 MMHG

## 2022-12-08 PROCEDURE — 99214 OFFICE O/P EST MOD 30 MIN: CPT

## 2022-12-08 NOTE — ADDENDUM
[FreeTextEntry1] : I, Blair Portillo acting as a scribe for Dr. Annabella Ornelas on December 8, 2022.\par

## 2022-12-08 NOTE — HISTORY OF PRESENT ILLNESS
[FreeTextEntry1] : SHAUN is a 72 year male here for follow up HLD,HTN, A-FIB\par \par  [de-identified] : Shashi is a 72 year male here today for F/u on HLD, HTN, Afib. He denied any acute complaints today. Pt attributed his weight gain to his daughter's wedding and the holidays. He reported he only drinks alcohol on special occasions. \par \par Pt reported he plans to have RT shoulder replacement with Dr. Serrato in mid-February 2023. Pt noted he was supposed to undergo endoscopy next week but was postponed due to March due to lack of transportation. Pt last saw cardio in October 2022

## 2022-12-08 NOTE — PLAN
[FreeTextEntry1] : - Previous labs discussed and reviewed, results normal\par - C/w current medication regimen \par - Adv f/u with cardio before Shoulder replacement in February 2023\par - F/u for pre-op clearance or sooner as needed

## 2022-12-08 NOTE — ASSESSMENT
[FreeTextEntry1] : # HLD\par - Previous labs discussed and reviewed\par - C/w Rosuvastatin Calcium 10 MG\par \par # HTN\par - C/w Current medication regimen

## 2022-12-08 NOTE — END OF VISIT
[FreeTextEntry3] : Medical record entries made by the scribe today today, were at my direction and personally dictated to them by me, Dr. Annabella Ornelas on December 8, 2022. I have reviewed the chart and agree that the record accurately reflects my personal performance of the history, physical exam, assessment, and plan.\par

## 2022-12-27 ENCOUNTER — OUTPATIENT (OUTPATIENT)
Dept: OUTPATIENT SERVICES | Facility: HOSPITAL | Age: 72
LOS: 1 days | End: 2022-12-27
Payer: MEDICARE

## 2022-12-27 ENCOUNTER — APPOINTMENT (OUTPATIENT)
Dept: CT IMAGING | Facility: CLINIC | Age: 72
End: 2022-12-27

## 2022-12-27 DIAGNOSIS — M19.011 PRIMARY OSTEOARTHRITIS, RIGHT SHOULDER: ICD-10-CM

## 2022-12-27 DIAGNOSIS — Z00.8 ENCOUNTER FOR OTHER GENERAL EXAMINATION: ICD-10-CM

## 2022-12-27 PROCEDURE — 73200 CT UPPER EXTREMITY W/O DYE: CPT

## 2022-12-27 PROCEDURE — 73200 CT UPPER EXTREMITY W/O DYE: CPT | Mod: 26,RT,MH

## 2022-12-28 ENCOUNTER — NON-APPOINTMENT (OUTPATIENT)
Age: 72
End: 2022-12-28

## 2023-01-26 ENCOUNTER — OFFICE (OUTPATIENT)
Dept: URBAN - METROPOLITAN AREA CLINIC 12 | Facility: CLINIC | Age: 73
Setting detail: OPHTHALMOLOGY
End: 2023-01-26
Payer: MEDICARE

## 2023-01-26 ENCOUNTER — APPOINTMENT (OUTPATIENT)
Dept: ORTHOPEDIC SURGERY | Facility: CLINIC | Age: 73
End: 2023-01-26
Payer: MEDICARE

## 2023-01-26 VITALS — HEIGHT: 69 IN | WEIGHT: 190 LBS | BODY MASS INDEX: 28.14 KG/M2

## 2023-01-26 DIAGNOSIS — H26.493: ICD-10-CM

## 2023-01-26 DIAGNOSIS — H01.001: ICD-10-CM

## 2023-01-26 DIAGNOSIS — H35.3131: ICD-10-CM

## 2023-01-26 DIAGNOSIS — H43.393: ICD-10-CM

## 2023-01-26 DIAGNOSIS — H01.004: ICD-10-CM

## 2023-01-26 PROCEDURE — 99214 OFFICE O/P EST MOD 30 MIN: CPT

## 2023-01-26 PROCEDURE — 92014 COMPRE OPH EXAM EST PT 1/>: CPT | Performed by: OPTOMETRIST

## 2023-01-26 ASSESSMENT — REFRACTION_MANIFEST
OD_VA1: 20/20
OD_SPHERE: +2.25
OS_SPHERE: +2.25
OS_CYLINDER: -0.25
OD_CYLINDER: 0.00
OD_SPHERE: +2.50
OS_AXIS: 025
OS_AXIS: 030
OS_VA1: 20/20
OD_VA1: 20/20
OS_VA1: 20/25
OD_CYLINDER: -0.25
OD_AXIS: 100
OS_SPHERE: +2.00
OS_CYLINDER: -0.25

## 2023-01-26 ASSESSMENT — REFRACTION_CURRENTRX
OS_OVR_VA: 20/
OS_OVR_VA: 20/
OD_SPHERE: +1.75
OD_ADD: +2.50
OD_OVR_VA: 20/
OD_VPRISM_DIRECTION: SV
OS_VPRISM_DIRECTION: SV
OS_VPRISM_DIRECTION: PROGS
OD_ADD: +2.50
OD_OVR_VA: 20/
OS_SPHERE: +1.50
OD_VPRISM_DIRECTION: PROGS
OS_ADD: +2.50
OS_CYLINDER: SPH
OD_CYLINDER: SPH
OS_ADD: +2.50

## 2023-01-26 ASSESSMENT — SPHEQUIV_DERIVED
OD_SPHEQUIV: 0.375
OS_SPHEQUIV: 2.125
OS_SPHEQUIV: 1.875
OS_SPHEQUIV: 0.375
OD_SPHEQUIV: 2.5
OD_SPHEQUIV: 2.125

## 2023-01-26 ASSESSMENT — VISUAL ACUITY
OD_BCVA: 20/30+1
OS_BCVA: 20/20-1

## 2023-01-26 ASSESSMENT — KERATOMETRY
OS_K1POWER_DIOPTERS: 43.00
OD_K1POWER_DIOPTERS: 42.25
OD_AXISANGLE_DEGREES: 77
OD_K2POWER_DIOPTERS: 43.00
OS_AXISANGLE_DEGREES: 114
OS_K2POWER_DIOPTERS: 43.50

## 2023-01-26 ASSESSMENT — TONOMETRY
OS_IOP_MMHG: 13
OD_IOP_MMHG: 11

## 2023-01-26 ASSESSMENT — REFRACTION_AUTOREFRACTION
OS_CYLINDER: -0.75
OD_SPHERE: +0.50
OS_SPHERE: +0.75
OD_CYLINDER: -0.25
OD_AXIS: 104
OS_AXIS: 33

## 2023-01-26 ASSESSMENT — LID EXAM ASSESSMENTS
OD_BLEPHARITIS: 1+
OS_BLEPHARITIS: 1+

## 2023-01-26 ASSESSMENT — AXIALLENGTH_DERIVED
OD_AL: 23.7685
OS_AL: 23.5378
OD_AL: 23.096
OS_AL: 22.9701
OS_AL: 22.8781
OD_AL: 22.9569

## 2023-01-26 ASSESSMENT — CONFRONTATIONAL VISUAL FIELD TEST (CVF)
OD_FINDINGS: FULL
OS_FINDINGS: FULL

## 2023-01-26 NOTE — PHYSICAL EXAM
[de-identified] : Physical Exam: \par General: Well appearing, no acute distress \par Neurologic: A&Ox3, No focal deficits \par Head: NCAT without abrasions, lacerations, or ecchymosis to head, face, or scalp \par Eyes: No scleral icterus, no gross abnormalities \par Respiratory: Equal chest wall expansion bilaterally, no accessory muscle use \par Lymphatic: No lymphadenopathy palpated \par Skin: Warm and dry \par Psychiatric: Normal mood and affect\par \par Examination of the Right shoulder shows no obvious deformity, swelling or erythema. Mild tenderness to palpation over the anterior shoulder. No AC joint tenderness. The patient demonstrates active/passive ROM of Forward Flexion to 165 degrees, External Rotation to 30 degrees and Internal Rotation to a mid lumbar level. The patient has a positive Dahl and Neers test. No pain with cross body adduction, lift off testing, AC compression testing or Yergason testing. The patient has 4/5 strength to forward flexion with pronation, internal and external rotation. Compartments are soft and nontender. The patient has 2+ cap refill and sensation is intact in the hand. \par \par Left shoulder shows no deformity. No tenderness to palpation over the biceps or AC joint. The patient has Forward Flexion to 170 degrees, External Rotation to 45 degrees and Internal Rotation to a mid lumbar level. 5/5 strength to forward flexion with pronation, internal and external rotation. Compartments are soft and nontender. 2+ cap refill. Sensation intact distally.\par  [de-identified] : EXAM: [id number] - CT SHOULDER ONLY RT  - ORDERED BY:  ROGERIO DAVILA\par \par \par PROCEDURE DATE:  12/27/2022\par  \par IMPRESSION:\par \par Severe glenohumeral osteoarthritis.

## 2023-01-26 NOTE — HISTORY OF PRESENT ILLNESS
[Worsening] : worsening [de-identified] : SHAUN CARUSO is a 72 year male being seen for f/u visit R shoulder pain. He is here today for CT scan review.

## 2023-01-26 NOTE — DISCUSSION/SUMMARY
[de-identified] : We had a thorough discussion regarding the nature of his pain, the pathophysiology, as well as all treatment options. Based on clinical exam, CT and radiograph findings he has severe osteoarthritis of the right shoulder. All risks, benefits and alternatives to the proposed surgical procedure, right reverse total shoulder replacement, were discussed in great detail with the patient. Risks include but are not limited to pain, bleeding, infection, stiffness, medical complications (including DVT, PE, MI), and risks of anesthesia. The patient expressed understanding and all questions were answered. The patient is electing to proceed, and will have the patient scheduled accordingly. I provided him contact number of my surgical coordinator Fred, who will go over dates for this procedure. All questions were answered.

## 2023-02-06 ENCOUNTER — APPOINTMENT (OUTPATIENT)
Dept: FAMILY MEDICINE | Facility: CLINIC | Age: 73
End: 2023-02-06
Payer: MEDICARE

## 2023-02-06 VITALS
OXYGEN SATURATION: 98 % | BODY MASS INDEX: 28.14 KG/M2 | SYSTOLIC BLOOD PRESSURE: 140 MMHG | TEMPERATURE: 97.2 F | HEART RATE: 58 BPM | DIASTOLIC BLOOD PRESSURE: 78 MMHG | HEIGHT: 69 IN | WEIGHT: 190 LBS

## 2023-02-06 DIAGNOSIS — Z01.818 ENCOUNTER FOR OTHER PREPROCEDURAL EXAMINATION: ICD-10-CM

## 2023-02-06 PROCEDURE — 99214 OFFICE O/P EST MOD 30 MIN: CPT

## 2023-02-06 RX ORDER — METOPROLOL SUCCINATE 100 MG/1
100 TABLET, EXTENDED RELEASE ORAL DAILY
Qty: 90 | Refills: 1 | Status: ACTIVE | COMMUNITY
Start: 2023-02-06

## 2023-02-06 RX ORDER — METOPROLOL SUCCINATE 50 MG/1
50 TABLET, EXTENDED RELEASE ORAL
Qty: 270 | Refills: 0 | Status: COMPLETED | COMMUNITY
Start: 2021-02-08 | End: 2023-02-06

## 2023-02-06 NOTE — HISTORY OF PRESENT ILLNESS
[Atrial Fibrillation] : atrial fibrillation [Sleep Apnea] : sleep apnea [No Adverse Anesthesia Reaction] : no adverse anesthesia reaction in self or family member [(Patient denies any chest pain, claudication, dyspnea on exertion, orthopnea, palpitations or syncope)] : Patient denies any chest pain, claudication, dyspnea on exertion, orthopnea, palpitations or syncope [Aortic Stenosis] : no aortic stenosis [Coronary Artery Disease] : no coronary artery disease [Recent Myocardial Infarction] : no recent myocardial infarction [Implantable Device/Pacemaker] : no implantable device/pacemaker [Asthma] : no asthma [COPD] : no COPD [Smoker] : not a smoker [Family Member] : no family member with adverse anesthesia reaction/sudden death [Self] : no previous adverse anesthesia reaction [Chronic Anticoagulation] : no chronic anticoagulation [Chronic Kidney Disease] : no chronic kidney disease [Diabetes] : no diabetes [FreeTextEntry1] : RT shoulder replacement  [FreeTextEntry2] : 2/15/2023 [FreeTextEntry3] :  [FreeTextEntry4] : Shashi is a 72 year male here today for MCA. Pt will be going for RT shoulder replacement with ortho Dr. Serrato on 02/15/23 at [facility]. He denied any acute complaints today. Pt will be going for PST on 02/07/23. He has underwent a knee replacement, cataracts, tooth extraction, and dental implants in the past. Pt denied any adverse reactions to anesthesia. Pt noted he saw cardio [place] for clearance last week and underwent bw. He consistently takes Aspirin 81 MG, Eliquis 5 MG,  Allopurinol 300 MG, Amlodipine Besylate 5 MG,  Metoprolol Succinate  MG, Losartan Potassium 50 MG, and Rosuvastatin Calcium 10 MG. He is to c/w Aspirin 81 MG and hold on Eliquis 5 MG 3 days prior to procedure. Pt also takes an OTC melatonin supplement and uses CPAP machine. \par \par Pt will be undergoing endoscopy with GI Dr. Howe in March 2023.

## 2023-02-06 NOTE — PLAN
[FreeTextEntry1] : - Pt optimized for procedure pending normal labs \par - Adv to inquire about CPAP machine at PST\par - C/w Aspirin 81 MG and hold on Eliquis 5 MG 3 days prior to procedure \par - Expectations reviewed with pt, all questions addressed\par - Pt will hold on all natural products, herbal medicines, vitamins, and other supplements. Pt will h/o NSAIDs.\par - F/u for EB in June 2023 or sooner as needed

## 2023-02-06 NOTE — ADDENDUM
[FreeTextEntry1] : I, Blair Portillo acting as a scribe for Dr. Annabella Ornelas on February 6, 2023.\par

## 2023-02-06 NOTE — END OF VISIT
[FreeTextEntry3] : Medical record entries made by the scribe today today, were at my direction and personally dictated to them by me, Dr. Annabella Ornelas on February 6, 2023. I have reviewed the chart and agree that the record accurately reflects my personal performance of the history, physical exam, assessment, and plan.\par

## 2023-02-06 NOTE — ASSESSMENT
[Patient Optimized for Surgery] : Patient optimized for surgery [Modify anticoagulant treatment prior to procedure] : Modify anticoagulant treatment prior to procedure [Modify anti-platelet treatment prior to procedure] : Modify anti-platelet treatment prior to procedure [Continue medications as is] : Continue current medications [As per surgery] : as per surgery [FreeTextEntry4] : # Pre-Op\par - Pt optimized for surgery pending normal labs \par - BP: 126/70, RUE, Sitting\par - Expectations reviewed with pt, all questions addressed\par - Pt will hold on all natural products, herbal medicines, vitamins, and other supplements. Pt will h/o NSAIDs.\par - C/w Aspirin 81 MG, Hold on Eliquis 5 MG 3 days prior to procedure\par \par # BESS\par - Adv to inquire about CPAP machine at [facility] \par

## 2023-02-07 ENCOUNTER — OUTPATIENT (OUTPATIENT)
Dept: OUTPATIENT SERVICES | Facility: HOSPITAL | Age: 73
LOS: 1 days | End: 2023-02-07
Payer: MEDICARE

## 2023-02-07 VITALS
DIASTOLIC BLOOD PRESSURE: 84 MMHG | OXYGEN SATURATION: 100 % | TEMPERATURE: 98 F | HEIGHT: 69 IN | HEART RATE: 59 BPM | RESPIRATION RATE: 16 BRPM | SYSTOLIC BLOOD PRESSURE: 153 MMHG | WEIGHT: 196.21 LBS

## 2023-02-07 DIAGNOSIS — Z29.9 ENCOUNTER FOR PROPHYLACTIC MEASURES, UNSPECIFIED: ICD-10-CM

## 2023-02-07 DIAGNOSIS — H26.9 UNSPECIFIED CATARACT: Chronic | ICD-10-CM

## 2023-02-07 DIAGNOSIS — Z98.890 OTHER SPECIFIED POSTPROCEDURAL STATES: Chronic | ICD-10-CM

## 2023-02-07 DIAGNOSIS — Z01.818 ENCOUNTER FOR OTHER PREPROCEDURAL EXAMINATION: ICD-10-CM

## 2023-02-07 DIAGNOSIS — M19.011 PRIMARY OSTEOARTHRITIS, RIGHT SHOULDER: ICD-10-CM

## 2023-02-07 DIAGNOSIS — C43.9 MALIGNANT MELANOMA OF SKIN, UNSPECIFIED: Chronic | ICD-10-CM

## 2023-02-07 DIAGNOSIS — Z96.651 PRESENCE OF RIGHT ARTIFICIAL KNEE JOINT: Chronic | ICD-10-CM

## 2023-02-07 DIAGNOSIS — Z92.89 PERSONAL HISTORY OF OTHER MEDICAL TREATMENT: Chronic | ICD-10-CM

## 2023-02-07 LAB
A1C WITH ESTIMATED AVERAGE GLUCOSE RESULT: 5.4 % — SIGNIFICANT CHANGE UP (ref 4–5.6)
ABO RH CONFIRMATION: SIGNIFICANT CHANGE UP
ALBUMIN SERPL ELPH-MCNC: 3.7 G/DL — SIGNIFICANT CHANGE UP (ref 3.3–5)
ANION GAP SERPL CALC-SCNC: 3 MMOL/L — LOW (ref 5–17)
APPEARANCE UR: CLEAR — SIGNIFICANT CHANGE UP
APTT BLD: 40.7 SEC — HIGH (ref 27.5–35.5)
BASOPHILS # BLD AUTO: 0.06 K/UL — SIGNIFICANT CHANGE UP (ref 0–0.2)
BASOPHILS NFR BLD AUTO: 0.9 % — SIGNIFICANT CHANGE UP (ref 0–2)
BILIRUB UR-MCNC: NEGATIVE — SIGNIFICANT CHANGE UP
BLD GP AB SCN SERPL QL: SIGNIFICANT CHANGE UP
BUN SERPL-MCNC: 18 MG/DL — SIGNIFICANT CHANGE UP (ref 7–23)
CALCIUM SERPL-MCNC: 8.6 MG/DL — SIGNIFICANT CHANGE UP (ref 8.5–10.1)
CHLORIDE SERPL-SCNC: 104 MMOL/L — SIGNIFICANT CHANGE UP (ref 96–108)
CO2 SERPL-SCNC: 31 MMOL/L — SIGNIFICANT CHANGE UP (ref 22–31)
COLOR SPEC: YELLOW — SIGNIFICANT CHANGE UP
CREAT SERPL-MCNC: 0.97 MG/DL — SIGNIFICANT CHANGE UP (ref 0.5–1.3)
DIFF PNL FLD: NEGATIVE — SIGNIFICANT CHANGE UP
EGFR: 82 ML/MIN/1.73M2 — SIGNIFICANT CHANGE UP
EOSINOPHIL # BLD AUTO: 0.18 K/UL — SIGNIFICANT CHANGE UP (ref 0–0.5)
EOSINOPHIL NFR BLD AUTO: 2.6 % — SIGNIFICANT CHANGE UP (ref 0–6)
ESTIMATED AVERAGE GLUCOSE: 108 MG/DL — SIGNIFICANT CHANGE UP (ref 68–114)
GLUCOSE SERPL-MCNC: 87 MG/DL — SIGNIFICANT CHANGE UP (ref 70–99)
GLUCOSE UR QL: NEGATIVE — SIGNIFICANT CHANGE UP
HCT VFR BLD CALC: 40.8 % — SIGNIFICANT CHANGE UP (ref 39–50)
HGB BLD-MCNC: 13.4 G/DL — SIGNIFICANT CHANGE UP (ref 13–17)
IMM GRANULOCYTES NFR BLD AUTO: 0.1 % — SIGNIFICANT CHANGE UP (ref 0–0.9)
INR BLD: 1.28 RATIO — HIGH (ref 0.88–1.16)
KETONES UR-MCNC: NEGATIVE — SIGNIFICANT CHANGE UP
LEUKOCYTE ESTERASE UR-ACNC: NEGATIVE — SIGNIFICANT CHANGE UP
LYMPHOCYTES # BLD AUTO: 1.03 K/UL — SIGNIFICANT CHANGE UP (ref 1–3.3)
LYMPHOCYTES # BLD AUTO: 14.7 % — SIGNIFICANT CHANGE UP (ref 13–44)
MCHC RBC-ENTMCNC: 29 PG — SIGNIFICANT CHANGE UP (ref 27–34)
MCHC RBC-ENTMCNC: 32.8 GM/DL — SIGNIFICANT CHANGE UP (ref 32–36)
MCV RBC AUTO: 88.3 FL — SIGNIFICANT CHANGE UP (ref 80–100)
MONOCYTES # BLD AUTO: 0.41 K/UL — SIGNIFICANT CHANGE UP (ref 0–0.9)
MONOCYTES NFR BLD AUTO: 5.8 % — SIGNIFICANT CHANGE UP (ref 2–14)
NEUTROPHILS # BLD AUTO: 5.32 K/UL — SIGNIFICANT CHANGE UP (ref 1.8–7.4)
NEUTROPHILS NFR BLD AUTO: 75.9 % — SIGNIFICANT CHANGE UP (ref 43–77)
NITRITE UR-MCNC: NEGATIVE — SIGNIFICANT CHANGE UP
PH UR: 7 — SIGNIFICANT CHANGE UP (ref 5–8)
PLATELET # BLD AUTO: 191 K/UL — SIGNIFICANT CHANGE UP (ref 150–400)
POTASSIUM SERPL-MCNC: 4 MMOL/L — SIGNIFICANT CHANGE UP (ref 3.5–5.3)
POTASSIUM SERPL-SCNC: 4 MMOL/L — SIGNIFICANT CHANGE UP (ref 3.5–5.3)
PROT UR-MCNC: NEGATIVE — SIGNIFICANT CHANGE UP
PROTHROM AB SERPL-ACNC: 14.9 SEC — HIGH (ref 10.5–13.4)
RBC # BLD: 4.62 M/UL — SIGNIFICANT CHANGE UP (ref 4.2–5.8)
RBC # FLD: 14.4 % — SIGNIFICANT CHANGE UP (ref 10.3–14.5)
SODIUM SERPL-SCNC: 138 MMOL/L — SIGNIFICANT CHANGE UP (ref 135–145)
SP GR SPEC: 1.01 — SIGNIFICANT CHANGE UP (ref 1.01–1.02)
UROBILINOGEN FLD QL: NEGATIVE — SIGNIFICANT CHANGE UP
WBC # BLD: 7.01 K/UL — SIGNIFICANT CHANGE UP (ref 3.8–10.5)
WBC # FLD AUTO: 7.01 K/UL — SIGNIFICANT CHANGE UP (ref 3.8–10.5)

## 2023-02-07 PROCEDURE — 93005 ELECTROCARDIOGRAM TRACING: CPT

## 2023-02-07 PROCEDURE — 86900 BLOOD TYPING SEROLOGIC ABO: CPT

## 2023-02-07 PROCEDURE — 85025 COMPLETE CBC W/AUTO DIFF WBC: CPT

## 2023-02-07 PROCEDURE — 85610 PROTHROMBIN TIME: CPT

## 2023-02-07 PROCEDURE — 85730 THROMBOPLASTIN TIME PARTIAL: CPT

## 2023-02-07 PROCEDURE — 36415 COLL VENOUS BLD VENIPUNCTURE: CPT

## 2023-02-07 PROCEDURE — 82040 ASSAY OF SERUM ALBUMIN: CPT

## 2023-02-07 PROCEDURE — 87641 MR-STAPH DNA AMP PROBE: CPT

## 2023-02-07 PROCEDURE — 93010 ELECTROCARDIOGRAM REPORT: CPT

## 2023-02-07 PROCEDURE — 86850 RBC ANTIBODY SCREEN: CPT

## 2023-02-07 PROCEDURE — 80048 BASIC METABOLIC PNL TOTAL CA: CPT

## 2023-02-07 PROCEDURE — 87640 STAPH A DNA AMP PROBE: CPT

## 2023-02-07 PROCEDURE — 99214 OFFICE O/P EST MOD 30 MIN: CPT | Mod: 25

## 2023-02-07 PROCEDURE — 81003 URINALYSIS AUTO W/O SCOPE: CPT

## 2023-02-07 PROCEDURE — 83036 HEMOGLOBIN GLYCOSYLATED A1C: CPT

## 2023-02-07 PROCEDURE — 86901 BLOOD TYPING SEROLOGIC RH(D): CPT

## 2023-02-07 NOTE — H&P PST ADULT - ASSESSMENT
72 y.o male scheduled for  72 y.o male scheduled for  Right Total Shoulder Replacement   Plan  1. Stop all NSAIDS, herbal supplements and vitamins for 7 days.  2. NPO at midnight.  3. Take the following medications Metoprolol, Losartan  with small sips of water on the morning of your procedure/surgery.  4. Use EZ sponges as directed  5. Use mupirocin as directed  6. Labs, EKG as per surgeon  7. PMD MARCELA Ornelas and MAURO Cummings cardiologist visit for optimization prior to surgery as per surgeon.  8. COVID swab to be done 2023    CAPRINI SCORE    AGE RELATED RISK FACTORS                                                       MOBILITY RELATED FACTORS  [ ] Age 41-60 years                                            (1 Point)                  [ ] Bed rest                                                        (1 Point)  [ ] Age: 61-74 years                                           (2 Points)                [ ] Plaster cast                                                   (2 Points)  [ ] Age= 75 years                                              (3 Points)                 [ ] Bed bound for more than 72 hours                   (2 Points)    DISEASE RELATED RISK FACTORS                                               GENDER SPECIFIC FACTORS  [ ] Edema in the lower extremities                       (1 Point)                  [ ] Pregnancy                                                     (1 Point)  [ ] Varicose veins                                               (1 Point)                  [ ] Post-partum < 6 weeks                                   (1 Point)             [ ] BMI > 25 Kg/m2                                            (1 Point)                  [ ] Hormonal therapy  or oral contraception            (1 Point)                 [ ] Sepsis (in the previous month)                        (1 Point)                  [ ] History of pregnancy complications  [ ] Pneumonia or serious lung disease                                               [ ] Unexplained or recurrent                       (1 Point)           (in the previous month)                               (1 Point)  [ ] Abnormal pulmonary function test                     (1 Point)                 SURGERY RELATED RISK FACTORS  [ ] Acute myocardial infarction                              (1 Point)                 [ ]  Section                                            (1 Point)  [ ] Congestive heart failure (in the previous month)  (1 Point)                 [ ] Minor surgery                                                 (1 Point)   [ ] Inflammatory bowel disease                             (1 Point)                 [ ] Arthroscopic surgery                                        (2 Points)  [ ] Central venous access                                    (2 Points)                [ ] General surgery lasting more than 45 minutes   (2 Points)       [ ] Stroke (in the previous month)                          (5 Points)               [ ] Elective arthroplasty                                        (5 Points)                                                                                                                                               HEMATOLOGY RELATED FACTORS                                                 TRAUMA RELATED RISK FACTORS  [ ] Prior episodes of VTE                                     (3 Points)                 [ ] Fracture of the hip, pelvis, or leg                       (5 Points)  [ ] Positive family history for VTE                         (3 Points)                 [ ] Acute spinal cord injury (in the previous month)  (5 Points)  [ ] Prothrombin 84428 A                                      (3 Points)                 [ ] Paralysis  (less than 1 month)                          (5 Points)  [ ] Factor V Leiden                                             (3 Points)                 [ ] Multiple Trauma within 1 month                         (5 Points)  [ ] Lupus anticoagulants                                     (3 Points)                                                           [ ] Anticardiolipin antibodies                                (3 Points)                                                       [ ] High homocysteine in the blood                      (3 Points)                                             [ ] Other congenital or acquired thrombophilia       (3 Points)                                                [ ] Heparin induced thrombocytopenia                  (3 Points)                                          Total Score [          ]   72 y.o male scheduled for  Right Total Shoulder Replacement   Plan  1. Stop all NSAIDS, herbal supplements and vitamins for 7 days.  2. NPO at midnight.  3. Take the following medications Metoprolol, Losartan  with small sips of water on the morning of your procedure/surgery.  4. Use EZ sponges as directed  5. Use mupirocin as directed  6. Labs, EKG as per surgeon  7. PMD MARCELA Ornelas and MAURO Cummings cardiologist visit for optimization prior to surgery as per surgeon.  8. COVID swab to be done 2023    CAPRINI SCORE    AGE RELATED RISK FACTORS                                                       MOBILITY RELATED FACTORS  [ ] Age 41-60 years                                            (1 Point)                  [ ] Bed rest                                                        (1 Point)  [ x] Age: 61-74 years                                           (2 Points)                [ ] Plaster cast                                                   (2 Points)  [ ] Age= 75 years                                              (3 Points)                 [ ] Bed bound for more than 72 hours                   (2 Points)    DISEASE RELATED RISK FACTORS                                               GENDER SPECIFIC FACTORS  [ ] Edema in the lower extremities                       (1 Point)                  [ ] Pregnancy                                                     (1 Point)  [ ] Varicose veins                                               (1 Point)                  [ ] Post-partum < 6 weeks                                   (1 Point)             [x ] BMI > 25 Kg/m2                                            (1 Point)                  [ ] Hormonal therapy  or oral contraception            (1 Point)                 [ ] Sepsis (in the previous month)                        (1 Point)                  [ ] History of pregnancy complications  [ ] Pneumonia or serious lung disease                                               [ ] Unexplained or recurrent                       (1 Point)           (in the previous month)                               (1 Point)  [ ] Abnormal pulmonary function test                     (1 Point)                 SURGERY RELATED RISK FACTORS  [ ] Acute myocardial infarction                              (1 Point)                 [ ]  Section                                            (1 Point)  [ ] Congestive heart failure (in the previous month)  (1 Point)                 [ ] Minor surgery                                                 (1 Point)   [ ] Inflammatory bowel disease                             (1 Point)                 [ ] Arthroscopic surgery                                        (2 Points)  [ ] Central venous access                                    (2 Points)                [ ] General surgery lasting more than 45 minutes   (2 Points)       [ ] Stroke (in the previous month)                          (5 Points)               [ x] Elective arthroplasty                                        (5 Points)                                                                                                                                               HEMATOLOGY RELATED FACTORS                                                 TRAUMA RELATED RISK FACTORS  [ ] Prior episodes of VTE                                     (3 Points)                 [ ] Fracture of the hip, pelvis, or leg                       (5 Points)  [ ] Positive family history for VTE                         (3 Points)                 [ ] Acute spinal cord injury (in the previous month)  (5 Points)  [ ] Prothrombin 54403 A                                      (3 Points)                 [ ] Paralysis  (less than 1 month)                          (5 Points)  [ ] Factor V Leiden                                             (3 Points)                 [ ] Multiple Trauma within 1 month                         (5 Points)  [ ] Lupus anticoagulants                                     (3 Points)                                                           [ ] Anticardiolipin antibodies                                (3 Points)                                                       [ ] High homocysteine in the blood                      (3 Points)                                             [ ] Other congenital or acquired thrombophilia       (3 Points)                                                [ ] Heparin induced thrombocytopenia                  (3 Points)                                          Total Score [      8    ]    The Caprini score indicates that this patient is at high risk for a VTE event (score > = 6).    Surgical patients in this group will benefit from both pharmacologic prophylaxis and intermittent compression devices.    The surgical team will determine the balance between VTE risk and bleeding risk, and other clinical considerations.

## 2023-02-07 NOTE — H&P PST ADULT - NSICDXPASTMEDICALHX_GEN_ALL_CORE_FT
PAST MEDICAL HISTORY:  Arthritis     Gout     History of gastric polyp     HTN (hypertension)     Hyperlipidemia     Paroxysmal atrial fibrillation     Skin cancer (melanoma)     Sleep apnea      PAST MEDICAL HISTORY:  Arthritis     Fatty liver     Gout     History of gastric polyp     HTN (hypertension)     Hyperlipidemia     Paroxysmal atrial fibrillation     Skin cancer (melanoma)     Sleep apnea      PAST MEDICAL HISTORY:  Arthritis     Deviated septum     Fatty liver     Gout     History of gastric polyp     HTN (hypertension)     Hyperlipidemia     Paroxysmal atrial fibrillation     Skin cancer (melanoma)     Sleep apnea

## 2023-02-07 NOTE — H&P PST ADULT - NSICDXPASTSURGICALHX_GEN_ALL_CORE_FT
PAST SURGICAL HISTORY:  H/O colonoscopy     H/O total knee replacement, right     History of dental surgery     History of esophagogastroduodenoscopy (EGD)     Skin cancer (melanoma)      PAST SURGICAL HISTORY:  Bilateral cataracts     H/O colonoscopy     H/O total knee replacement, right     History of dental surgery     History of esophagogastroduodenoscopy (EGD)     Skin cancer (melanoma)

## 2023-02-07 NOTE — H&P PST ADULT - HISTORY OF PRESENT ILLNESS
72 y.o  72 y.o WD, WN male presents to PST with hx of right shoulder pain. Patient reports since this past summer his right shoulder pain has worsened with decreasing ROM. His pain is impacting his activities of daily living, Patient has followed with ortho and states diagnostics revealing arthritis as well has muscle damage. He has discussed with surgeon and scheduled for a Right Total Shoulder Replacement

## 2023-02-07 NOTE — H&P PST ADULT - NSICDXFAMILYHX_GEN_ALL_CORE_FT
FAMILY HISTORY:  FH: melanoma    Father  Still living? No  FH: heart attack, Age at diagnosis: Age Unknown

## 2023-02-08 DIAGNOSIS — Z01.818 ENCOUNTER FOR OTHER PREPROCEDURAL EXAMINATION: ICD-10-CM

## 2023-02-08 DIAGNOSIS — M19.011 PRIMARY OSTEOARTHRITIS, RIGHT SHOULDER: ICD-10-CM

## 2023-02-08 LAB
MRSA PCR RESULT.: SIGNIFICANT CHANGE UP
S AUREUS DNA NOSE QL NAA+PROBE: SIGNIFICANT CHANGE UP

## 2023-02-13 LAB — SARS-COV-2 N GENE NPH QL NAA+PROBE: NOT DETECTED

## 2023-02-13 RX ORDER — TRANEXAMIC ACID 100 MG/ML
1000 INJECTION, SOLUTION INTRAVENOUS ONCE
Refills: 0 | Status: DISCONTINUED | OUTPATIENT
Start: 2023-02-15 | End: 2023-02-16

## 2023-02-14 RX ORDER — PANTOPRAZOLE SODIUM 20 MG/1
40 TABLET, DELAYED RELEASE ORAL
Refills: 0 | Status: DISCONTINUED | OUTPATIENT
Start: 2023-02-15 | End: 2023-02-16

## 2023-02-14 RX ORDER — OXYCODONE HYDROCHLORIDE 5 MG/1
5 TABLET ORAL
Refills: 0 | Status: DISCONTINUED | OUTPATIENT
Start: 2023-02-15 | End: 2023-02-16

## 2023-02-14 RX ORDER — SODIUM CHLORIDE 9 MG/ML
1000 INJECTION, SOLUTION INTRAVENOUS
Refills: 0 | Status: DISCONTINUED | OUTPATIENT
Start: 2023-02-15 | End: 2023-02-16

## 2023-02-14 RX ORDER — CELECOXIB 200 MG/1
200 CAPSULE ORAL EVERY 12 HOURS
Refills: 0 | Status: DISCONTINUED | OUTPATIENT
Start: 2023-02-15 | End: 2023-02-16

## 2023-02-14 RX ORDER — ACETAMINOPHEN 500 MG
650 TABLET ORAL EVERY 6 HOURS
Refills: 0 | Status: DISCONTINUED | OUTPATIENT
Start: 2023-02-15 | End: 2023-02-16

## 2023-02-14 RX ORDER — OXYCODONE HYDROCHLORIDE 5 MG/1
10 TABLET ORAL
Refills: 0 | Status: DISCONTINUED | OUTPATIENT
Start: 2023-02-15 | End: 2023-02-16

## 2023-02-14 RX ORDER — ONDANSETRON 8 MG/1
4 TABLET, FILM COATED ORAL EVERY 6 HOURS
Refills: 0 | Status: DISCONTINUED | OUTPATIENT
Start: 2023-02-15 | End: 2023-02-16

## 2023-02-14 RX ORDER — HYDROMORPHONE HYDROCHLORIDE 2 MG/ML
0.5 INJECTION INTRAMUSCULAR; INTRAVENOUS; SUBCUTANEOUS EVERY 4 HOURS
Refills: 0 | Status: DISCONTINUED | OUTPATIENT
Start: 2023-02-15 | End: 2023-02-16

## 2023-02-15 ENCOUNTER — TRANSCRIPTION ENCOUNTER (OUTPATIENT)
Age: 73
End: 2023-02-15

## 2023-02-15 ENCOUNTER — RESULT REVIEW (OUTPATIENT)
Age: 73
End: 2023-02-15

## 2023-02-15 ENCOUNTER — OUTPATIENT (OUTPATIENT)
Dept: INPATIENT UNIT | Facility: HOSPITAL | Age: 73
LOS: 1 days | Discharge: HOME | End: 2023-02-15
Payer: MEDICARE

## 2023-02-15 ENCOUNTER — APPOINTMENT (OUTPATIENT)
Age: 73
End: 2023-02-15

## 2023-02-15 VITALS
HEIGHT: 69 IN | OXYGEN SATURATION: 100 % | SYSTOLIC BLOOD PRESSURE: 145 MMHG | RESPIRATION RATE: 14 BRPM | WEIGHT: 196.21 LBS | DIASTOLIC BLOOD PRESSURE: 84 MMHG | TEMPERATURE: 98 F | HEART RATE: 50 BPM

## 2023-02-15 DIAGNOSIS — Z96.651 PRESENCE OF RIGHT ARTIFICIAL KNEE JOINT: Chronic | ICD-10-CM

## 2023-02-15 DIAGNOSIS — C43.9 MALIGNANT MELANOMA OF SKIN, UNSPECIFIED: Chronic | ICD-10-CM

## 2023-02-15 DIAGNOSIS — Z98.890 OTHER SPECIFIED POSTPROCEDURAL STATES: Chronic | ICD-10-CM

## 2023-02-15 DIAGNOSIS — H26.9 UNSPECIFIED CATARACT: Chronic | ICD-10-CM

## 2023-02-15 DIAGNOSIS — M19.011 PRIMARY OSTEOARTHRITIS, RIGHT SHOULDER: ICD-10-CM

## 2023-02-15 DIAGNOSIS — Z92.89 PERSONAL HISTORY OF OTHER MEDICAL TREATMENT: Chronic | ICD-10-CM

## 2023-02-15 LAB
ANION GAP SERPL CALC-SCNC: 5 MMOL/L — SIGNIFICANT CHANGE UP (ref 5–17)
BUN SERPL-MCNC: 17 MG/DL — SIGNIFICANT CHANGE UP (ref 7–23)
CALCIUM SERPL-MCNC: 8.4 MG/DL — LOW (ref 8.5–10.1)
CHLORIDE SERPL-SCNC: 105 MMOL/L — SIGNIFICANT CHANGE UP (ref 96–108)
CO2 SERPL-SCNC: 28 MMOL/L — SIGNIFICANT CHANGE UP (ref 22–31)
CREAT SERPL-MCNC: 0.88 MG/DL — SIGNIFICANT CHANGE UP (ref 0.5–1.3)
EGFR: 90 ML/MIN/1.73M2 — SIGNIFICANT CHANGE UP
GLUCOSE BLDC GLUCOMTR-MCNC: 126 MG/DL — HIGH (ref 70–99)
GLUCOSE BLDC GLUCOMTR-MCNC: 234 MG/DL — HIGH (ref 70–99)
GLUCOSE SERPL-MCNC: 122 MG/DL — HIGH (ref 70–99)
HCT VFR BLD CALC: 36.9 % — LOW (ref 39–50)
HGB BLD-MCNC: 12.4 G/DL — LOW (ref 13–17)
MCHC RBC-ENTMCNC: 29 PG — SIGNIFICANT CHANGE UP (ref 27–34)
MCHC RBC-ENTMCNC: 33.6 GM/DL — SIGNIFICANT CHANGE UP (ref 32–36)
MCV RBC AUTO: 86.2 FL — SIGNIFICANT CHANGE UP (ref 80–100)
PLATELET # BLD AUTO: 177 K/UL — SIGNIFICANT CHANGE UP (ref 150–400)
POTASSIUM SERPL-MCNC: 3.9 MMOL/L — SIGNIFICANT CHANGE UP (ref 3.5–5.3)
POTASSIUM SERPL-SCNC: 3.9 MMOL/L — SIGNIFICANT CHANGE UP (ref 3.5–5.3)
RBC # BLD: 4.28 M/UL — SIGNIFICANT CHANGE UP (ref 4.2–5.8)
RBC # FLD: 14.3 % — SIGNIFICANT CHANGE UP (ref 10.3–14.5)
SODIUM SERPL-SCNC: 138 MMOL/L — SIGNIFICANT CHANGE UP (ref 135–145)
WBC # BLD: 11.59 K/UL — HIGH (ref 3.8–10.5)
WBC # FLD AUTO: 11.59 K/UL — HIGH (ref 3.8–10.5)

## 2023-02-15 PROCEDURE — 36415 COLL VENOUS BLD VENIPUNCTURE: CPT

## 2023-02-15 PROCEDURE — C1713: CPT

## 2023-02-15 PROCEDURE — 80048 BASIC METABOLIC PNL TOTAL CA: CPT

## 2023-02-15 PROCEDURE — 99204 OFFICE O/P NEW MOD 45 MIN: CPT

## 2023-02-15 PROCEDURE — 23472 RECONSTRUCT SHOULDER JOINT: CPT | Mod: RT

## 2023-02-15 PROCEDURE — 88305 TISSUE EXAM BY PATHOLOGIST: CPT | Mod: 26

## 2023-02-15 PROCEDURE — 73030 X-RAY EXAM OF SHOULDER: CPT | Mod: RT

## 2023-02-15 PROCEDURE — C1776: CPT

## 2023-02-15 PROCEDURE — 88305 TISSUE EXAM BY PATHOLOGIST: CPT

## 2023-02-15 PROCEDURE — 85027 COMPLETE CBC AUTOMATED: CPT

## 2023-02-15 PROCEDURE — 82962 GLUCOSE BLOOD TEST: CPT

## 2023-02-15 PROCEDURE — 73030 X-RAY EXAM OF SHOULDER: CPT | Mod: 26,RT

## 2023-02-15 RX ORDER — FENTANYL CITRATE 50 UG/ML
50 INJECTION INTRAVENOUS
Refills: 0 | Status: DISCONTINUED | OUTPATIENT
Start: 2023-02-15 | End: 2023-02-15

## 2023-02-15 RX ORDER — FLUTICASONE PROPIONATE 50 MCG
1 SPRAY, SUSPENSION NASAL
Qty: 0 | Refills: 0 | DISCHARGE

## 2023-02-15 RX ORDER — ALLOPURINOL 300 MG
300 TABLET ORAL DAILY
Refills: 0 | Status: DISCONTINUED | OUTPATIENT
Start: 2023-02-15 | End: 2023-02-16

## 2023-02-15 RX ORDER — METOPROLOL TARTRATE 50 MG
100 TABLET ORAL DAILY
Refills: 0 | Status: DISCONTINUED | OUTPATIENT
Start: 2023-02-15 | End: 2023-02-16

## 2023-02-15 RX ORDER — METOPROLOL TARTRATE 50 MG
1 TABLET ORAL
Qty: 0 | Refills: 0 | DISCHARGE

## 2023-02-15 RX ORDER — PANTOPRAZOLE SODIUM 20 MG/1
1 TABLET, DELAYED RELEASE ORAL
Qty: 30 | Refills: 0
Start: 2023-02-15 | End: 2023-03-16

## 2023-02-15 RX ORDER — AMLODIPINE BESYLATE 2.5 MG/1
5 TABLET ORAL DAILY
Refills: 0 | Status: DISCONTINUED | OUTPATIENT
Start: 2023-02-15 | End: 2023-02-16

## 2023-02-15 RX ORDER — CEFAZOLIN SODIUM 1 G
2000 VIAL (EA) INJECTION EVERY 8 HOURS
Refills: 0 | Status: COMPLETED | OUTPATIENT
Start: 2023-02-15 | End: 2023-02-16

## 2023-02-15 RX ORDER — ROSUVASTATIN CALCIUM 5 MG/1
1 TABLET ORAL
Qty: 0 | Refills: 0 | DISCHARGE

## 2023-02-15 RX ORDER — UBIDECARENONE 100 MG
0 CAPSULE ORAL
Qty: 0 | Refills: 0 | DISCHARGE

## 2023-02-15 RX ORDER — SENNA PLUS 8.6 MG/1
2 TABLET ORAL
Qty: 28 | Refills: 0
Start: 2023-02-15 | End: 2023-02-28

## 2023-02-15 RX ORDER — CELECOXIB 200 MG/1
1 CAPSULE ORAL
Qty: 60 | Refills: 0
Start: 2023-02-15 | End: 2023-03-16

## 2023-02-15 RX ORDER — ALLOPURINOL 300 MG
1 TABLET ORAL
Qty: 0 | Refills: 0 | DISCHARGE

## 2023-02-15 RX ORDER — ONDANSETRON 8 MG/1
1 TABLET, FILM COATED ORAL
Qty: 15 | Refills: 0
Start: 2023-02-15 | End: 2023-02-19

## 2023-02-15 RX ORDER — PREGABALIN 225 MG/1
0 CAPSULE ORAL
Qty: 0 | Refills: 0 | DISCHARGE

## 2023-02-15 RX ORDER — ACETAMINOPHEN 500 MG
2 TABLET ORAL
Qty: 84 | Refills: 0
Start: 2023-02-15 | End: 2023-02-28

## 2023-02-15 RX ORDER — OXYCODONE HYDROCHLORIDE 5 MG/1
5 TABLET ORAL ONCE
Refills: 0 | Status: DISCONTINUED | OUTPATIENT
Start: 2023-02-15 | End: 2023-02-15

## 2023-02-15 RX ORDER — APIXABAN 2.5 MG/1
5 TABLET, FILM COATED ORAL EVERY 12 HOURS
Refills: 0 | Status: DISCONTINUED | OUTPATIENT
Start: 2023-02-16 | End: 2023-02-16

## 2023-02-15 RX ORDER — ASCORBIC ACID 60 MG
0 TABLET,CHEWABLE ORAL
Qty: 0 | Refills: 0 | DISCHARGE

## 2023-02-15 RX ORDER — ONDANSETRON 8 MG/1
4 TABLET, FILM COATED ORAL ONCE
Refills: 0 | Status: DISCONTINUED | OUTPATIENT
Start: 2023-02-15 | End: 2023-02-15

## 2023-02-15 RX ORDER — L.ACIDOPH/B.ANIMALIS/B.LONGUM 15B CELL
1 CAPSULE ORAL
Qty: 0 | Refills: 0 | DISCHARGE

## 2023-02-15 RX ORDER — PANTOPRAZOLE SODIUM 20 MG/1
40 TABLET, DELAYED RELEASE ORAL ONCE
Refills: 0 | Status: COMPLETED | OUTPATIENT
Start: 2023-02-15 | End: 2023-02-15

## 2023-02-15 RX ORDER — AMLODIPINE BESYLATE 2.5 MG/1
1 TABLET ORAL
Qty: 0 | Refills: 0 | DISCHARGE

## 2023-02-15 RX ORDER — SODIUM CHLORIDE 9 MG/ML
1000 INJECTION, SOLUTION INTRAVENOUS
Refills: 0 | Status: DISCONTINUED | OUTPATIENT
Start: 2023-02-15 | End: 2023-02-15

## 2023-02-15 RX ORDER — ASPIRIN/CALCIUM CARB/MAGNESIUM 324 MG
0 TABLET ORAL
Qty: 0 | Refills: 0 | DISCHARGE

## 2023-02-15 RX ORDER — HEPARIN SODIUM 5000 [USP'U]/ML
5000 INJECTION INTRAVENOUS; SUBCUTANEOUS ONCE
Refills: 0 | Status: COMPLETED | OUTPATIENT
Start: 2023-02-15 | End: 2023-02-15

## 2023-02-15 RX ORDER — POLYETHYLENE GLYCOL 3350 17 G/17G
17 POWDER, FOR SOLUTION ORAL
Qty: 1 | Refills: 0
Start: 2023-02-15 | End: 2023-02-28

## 2023-02-15 RX ORDER — OMEGA-3 ACID ETHYL ESTERS 1 G
0 CAPSULE ORAL
Qty: 0 | Refills: 0 | DISCHARGE

## 2023-02-15 RX ORDER — ZINC SULFATE TAB 220 MG (50 MG ZINC EQUIVALENT) 220 (50 ZN) MG
0 TAB ORAL
Qty: 0 | Refills: 0 | DISCHARGE

## 2023-02-15 RX ORDER — CHOLECALCIFEROL (VITAMIN D3) 125 MCG
0 CAPSULE ORAL
Qty: 0 | Refills: 0 | DISCHARGE

## 2023-02-15 RX ORDER — ASPIRIN/CALCIUM CARB/MAGNESIUM 324 MG
81 TABLET ORAL DAILY
Refills: 0 | Status: DISCONTINUED | OUTPATIENT
Start: 2023-02-16 | End: 2023-02-16

## 2023-02-15 RX ORDER — DICLOFENAC SODIUM 30 MG/G
0 GEL TOPICAL
Qty: 0 | Refills: 0 | DISCHARGE

## 2023-02-15 RX ORDER — ATORVASTATIN CALCIUM 80 MG/1
40 TABLET, FILM COATED ORAL AT BEDTIME
Refills: 0 | Status: DISCONTINUED | OUTPATIENT
Start: 2023-02-15 | End: 2023-02-16

## 2023-02-15 RX ORDER — APIXABAN 2.5 MG/1
1 TABLET, FILM COATED ORAL
Qty: 0 | Refills: 0 | DISCHARGE

## 2023-02-15 RX ORDER — OXYCODONE HYDROCHLORIDE 5 MG/1
1 TABLET ORAL
Qty: 30 | Refills: 0
Start: 2023-02-15 | End: 2023-02-19

## 2023-02-15 RX ADMIN — Medication 100 MILLIGRAM(S): at 19:10

## 2023-02-15 RX ADMIN — Medication 650 MILLIGRAM(S): at 19:10

## 2023-02-15 RX ADMIN — CELECOXIB 200 MILLIGRAM(S): 200 CAPSULE ORAL at 21:40

## 2023-02-15 RX ADMIN — ATORVASTATIN CALCIUM 40 MILLIGRAM(S): 80 TABLET, FILM COATED ORAL at 21:40

## 2023-02-15 RX ADMIN — PANTOPRAZOLE SODIUM 40 MILLIGRAM(S): 20 TABLET, DELAYED RELEASE ORAL at 11:07

## 2023-02-15 RX ADMIN — HEPARIN SODIUM 5000 UNIT(S): 5000 INJECTION INTRAVENOUS; SUBCUTANEOUS at 21:40

## 2023-02-15 NOTE — DISCHARGE NOTE PROVIDER - CARE PROVIDER_API CALL
Hrenandez Serrato (DO)  [address], [address]  [place] [address]  Phone: [phone]  Fax: [phone]  Follow Up Time:

## 2023-02-15 NOTE — ASU PATIENT PROFILE, ADULT - NSICDXPASTSURGICALHX_GEN_ALL_CORE_FT
PAST SURGICAL HISTORY:  Bilateral cataracts     H/O colonoscopy     H/O total knee replacement, right     History of dental surgery     History of esophagogastroduodenoscopy (EGD)     Skin cancer (melanoma)

## 2023-02-15 NOTE — DISCHARGE NOTE PROVIDER - NSDCCPTREATMENT_GEN_ALL_CORE_FT
PRINCIPAL PROCEDURE  Procedure: Arthroplasty of right shoulder  Findings and Treatment: Right Total Shoulder Arthroplasty

## 2023-02-15 NOTE — DISCHARGE NOTE PROVIDER - NSDCFUSCHEDAPPT_GEN_ALL_CORE_FT
[facility]  ORTHOSURG [place] [address]  Scheduled Appointment: 02/15/2023    [facility]  HNT PreAdmits  Scheduled Appointment: 02/15/2023    [facility]  ORTHOSURG [address]  Scheduled Appointment: 03/01/2023    [facility]  ORTHOSURG [address]  Scheduled Appointment: 04/10/2023    [facility]  ORTHOSURG [address]  Scheduled Appointment: 05/15/2023     [facility]  ORTHOSURG [address]  Scheduled Appointment: 03/01/2023    Hernandez Serrato  [facility]  ORTHOSURG [address]  Scheduled Appointment: 04/10/2023    Hernandez Serrato  [facility]  ORTHOSUR [address]  Scheduled Appointment: 05/15/2023

## 2023-02-15 NOTE — DISCHARGE NOTE PROVIDER - NSDCACTIVITY_GEN_ALL_CORE
Showering allowed/Stairs allowed/Walking - Indoors allowed/No heavy lifting/straining/Walking - Outdoors allowed/Follow Instructions Provided by your Surgical Team no

## 2023-02-15 NOTE — BRIEF OPERATIVE NOTE - DISPOSITION
pacu floors Ivermectin Counseling:  Patient instructed to take medication on an empty stomach with a full glass of water.  Patient informed of potential adverse effects including but not limited to nausea, diarrhea, dizziness, itching, and swelling of the extremities or lymph nodes.  The patient verbalized understanding of the proper use and possible adverse effects of ivermectin.  All of the patient's questions and concerns were addressed.

## 2023-02-15 NOTE — CONSULT NOTE ADULT - SUBJECTIVE AND OBJECTIVE BOX
PCP: Annabella Pires    CHIEF COMPLAINT: Right shoulder OA    HISTORY OF THE PRESENT ILLNESS: this is a 71 y/o M with PMH Paroxysmal AFIB (on Eliquis and ASA), BESS (home CPAP), HLD, HTN, melanoma of right shoulder OA with progressive pain, decreasing ROM, and impacting his ADLs, failed the usual modalities for pain and is now s/p reverse right total shoulder replacement.      Pt is seen in PACU, still sedated, vital signs stable. We are consulted for medical management    PAST MEDICAL HISTORY: see above    PAST SURGICAL HISTORY: right total knee replacement    FAMILY HISTORY: Father; melanoma and MI,  age unknown    SOCIAL HISTORY:  Former smoker, occasional alcohol (hx of excessive ETOH 2021), no drugs    ALLERGIES: Clindamycin    HOME MEDS: see med rec    REVIEW OF SYSTEMS: unable to assess secondary to sedation    MEDICATIONS  (STANDING):  lactated ringers. 1000 milliLiter(s) (125 mL/Hr) IV Continuous <Continuous>  tranexamic acid IVPB 1000 milliGRAM(s) IV Intermittent once  tranexamic acid IVPB 1000 milliGRAM(s) IV Intermittent once  MEDICATIONS  (PRN):  fentaNYL    Injectable 50 MICROGram(s) IV Push every 10 minutes PRN Severe Pain (7 - 10)  ondansetron Injectable 4 milliGRAM(s) IV Push once PRN Nausea and/or Vomiting  oxyCODONE    IR 5 milliGRAM(s) Oral once PRN Moderate Pain (4 - 6)      Vital Signs Last 24 Hrs  T(C): 36.3 (15 Feb 2023 14:15), Max: 36.4 (15 Feb 2023 10:59)  T(F): 97.4 (15 Feb 2023 14:15), Max: 97.6 (15 Feb 2023 10:59)  HR: 58 (15 Feb 2023 15:00) (50 - 66)  BP: 129/68 (15 Feb 2023 15:00) (129/68 - 145/84)  BP(mean): --  RR: 11 (15 Feb 2023 15:00) (10 - 14)  SpO2: 97% (15 Feb 2023 15:00) (97% - 100%)    Parameters below as of 15 Feb 2023 15:00    O2 Flow (L/min): 3    I&O's Summary    15 Feb 2023 07:01  -  15 2023 14:59  --------------------------------------------------------  IN: 700 mL / OUT: 0 mL / NET: 700 mL        CAPILLARY BLOOD GLUCOSE  POCT Blood Glucose.: 126 mg/dL (15 Feb 2023 14:26)      PHYSICAL EXAM:    GENERAL: Comfortable, no acute distress   HEAD:  Normocephalic, atraumatic  EYES: EOMI, PERRLA  HEENT: Moist mucous membranes  NECK: Supple, No JVD  NERVOUS SYSTEM:  Sedated  CHEST/LUNG: Clear to auscultation bilaterally  HEART: Regular rate and rhythm  ABDOMEN: Soft, non tender, Nondistended, Bowel sounds present  GENITOURINARY: due to void, no palpable bladder  EXTREMITIES:   No clubbing, cyanosis, mild edema noted to shoulder region, +2 radial pulse, right hand warm, cap refill <3 seconds  MUSCULOSKELETAL- Drsg CDI to right shoulder with ice applied  SKIN-no rash          LABS:                          12.4   11.59 )-----------( 177      ( 15 Feb 2023 14:39 )             36.9       IMPRESSION: 71 y/o M with above pmh a/w:  # OA  # S/P reverse right total shoulder replacement  POD#0  pain control  PT eval  Bowel regimen  IVF's  Finish ABXs  regular diet  PPI  VTE prophylaxis  monitor CBC/BMP    #Paroxysmal AFIB  as per cardio recs, continue ASA  resume Eliquis when ok with ortho  patient noted to be in NSR in PACU    # HTN  continue BB/CCB  pt at high risk for fluctuations in B/P 2/2 anesthesia, pain, hypovolemia and use of narcotics, placing pt at high risk for MI/CVA  monitor B/P closely  adjust anti-htn's accordingly    # HLD  continue statin    #BESS on CPAP at home  continuous pulse oximetry ordered  monitor 02 sats, supplement o2 to maintain sat >92%    #Leukocytosis  likely reactive  continue to monitor WBC    # Vte prophylaxis  Venodynes  INC mobility  resume Eliquis when ok with surgery      Thank you for the consult, will follow         PCP: Annabella Pires    CHIEF COMPLAINT: Right shoulder OA    HISTORY OF THE PRESENT ILLNESS: this is a 73 y/o M with PMH Paroxysmal AFIB (on Eliquis and ASA), BESS (home CPAP), HLD, HTN, melanoma, and OA of his right shoulder, with progressive pain, decreasing ROM, and impacting his ADLs, failed the usual modalities for pain and is now s/p reverse right total shoulder replacement.      Pt is seen in PACU, still sedated, vital signs stable. We are consulted for medical management    PAST MEDICAL HISTORY: see above    PAST SURGICAL HISTORY: right total knee replacement    FAMILY HISTORY: Father; melanoma and MI,  age unknown    SOCIAL HISTORY:  Former smoker, occasional alcohol (hx of excessive ETOH 2021), no drugs    ALLERGIES: Clindamycin    HOME MEDS: see med rec    REVIEW OF SYSTEMS: unable to assess secondary to sedation    MEDICATIONS  (STANDING):  lactated ringers. 1000 milliLiter(s) (125 mL/Hr) IV Continuous <Continuous>  tranexamic acid IVPB 1000 milliGRAM(s) IV Intermittent once  tranexamic acid IVPB 1000 milliGRAM(s) IV Intermittent once  MEDICATIONS  (PRN):  fentaNYL    Injectable 50 MICROGram(s) IV Push every 10 minutes PRN Severe Pain (7 - 10)  ondansetron Injectable 4 milliGRAM(s) IV Push once PRN Nausea and/or Vomiting  oxyCODONE    IR 5 milliGRAM(s) Oral once PRN Moderate Pain (4 - 6)      Vital Signs Last 24 Hrs  T(C): 36.3 (15 Feb 2023 14:15), Max: 36.4 (15 Feb 2023 10:59)  T(F): 97.4 (15 Feb 2023 14:15), Max: 97.6 (15 Feb 2023 10:59)  HR: 58 (15 Feb 2023 15:00) (50 - 66)  BP: 129/68 (15 Feb 2023 15:00) (129/68 - 145/84)  BP(mean): --  RR: 11 (15 Feb 2023 15:00) (10 - 14)  SpO2: 97% (15 Feb 2023 15:00) (97% - 100%)    Parameters below as of 15 Feb 2023 15:00    O2 Flow (L/min): 3    I&O's Summary    15 Feb 2023 07:01  -  15 Feb 2023 14:59  --------------------------------------------------------  IN: 700 mL / OUT: 0 mL / NET: 700 mL        CAPILLARY BLOOD GLUCOSE  POCT Blood Glucose.: 126 mg/dL (15 Feb 2023 14:26)      PHYSICAL EXAM:    GENERAL: Comfortable, no acute distress   HEAD:  Normocephalic, atraumatic  EYES: EOMI, PERRLA  HEENT: Moist mucous membranes  NECK: Supple, No JVD  NERVOUS SYSTEM:  Sedated  CHEST/LUNG: Clear to auscultation bilaterally  HEART: Regular rate and rhythm  ABDOMEN: Soft, non tender, Nondistended, Bowel sounds present  GENITOURINARY: due to void, no palpable bladder  EXTREMITIES:   No clubbing, cyanosis, mild edema noted to shoulder region, +2 radial pulse, right hand warm, cap refill <3 seconds  MUSCULOSKELETAL- Drsg CDI to right shoulder with ice applied  SKIN-no rash          LABS:                        12.4   11.59 )-----------( 177      ( 15 Feb 2023 14:39 )             36.9         IMPRESSION: 73 y/o M with above pmh a/w:  # right shoulder OA  # S/P reverse right total shoulder replacement  POD#0  pain control  PT eval  Bowel regimen  IVF's  Finish ABXs  DASH diet  PPI  VTE prophylaxis  monitor CBC/BMP    #Paroxysmal AFIB  as per cardio recs, continue ASA  resume Eliquis when ok with ortho  patient noted to be in NSR in PACU    # HTN  continue BB/CCB  pt at high risk for fluctuations in B/P 2/2 anesthesia, pain, hypovolemia and use of narcotics, placing pt at high risk for MI/CVA  monitor B/P closely  adjust anti-htn's accordingly    # HLD  continue statin    #BESS on CPAP at home  continuous pulse oximetry ordered  monitor 02 sats, supplement o2 to maintain sat >92%    #Leukocytosis  likely reactive  continue to monitor WBC    # Vte prophylaxis  Venodynes  INC mobility  resume Eliquis when ok with surgery      Thank you for the consult, will follow         PCP: Annabella Pires    CHIEF COMPLAINT: Right shoulder OA    HISTORY OF THE PRESENT ILLNESS: this is a 73 y/o M with PMH Paroxysmal AFIB (on Eliquis and ASA), BESS (home CPAP), HLD, HTN, melanoma, and OA of his right shoulder, with progressive pain, decreasing ROM, and impacting his ADLs, failed the usual modalities for pain and is now s/p reverse right total shoulder replacement.      Pt is seen in PACU, still sedated, vital signs stable. We are consulted for medical management    PAST MEDICAL HISTORY: see above    PAST SURGICAL HISTORY: right total knee replacement    FAMILY HISTORY: Father; melanoma and MI,  age unknown    SOCIAL HISTORY:  Former smoker, occasional alcohol (hx of excessive ETOH 2021), no drugs    ALLERGIES: Clindamycin    HOME MEDS: see med rec    REVIEW OF SYSTEMS: unable to assess secondary to sedation    MEDICATIONS  (STANDING):  lactated ringers. 1000 milliLiter(s) (125 mL/Hr) IV Continuous <Continuous>  tranexamic acid IVPB 1000 milliGRAM(s) IV Intermittent once  tranexamic acid IVPB 1000 milliGRAM(s) IV Intermittent once  MEDICATIONS  (PRN):  fentaNYL    Injectable 50 MICROGram(s) IV Push every 10 minutes PRN Severe Pain (7 - 10)  ondansetron Injectable 4 milliGRAM(s) IV Push once PRN Nausea and/or Vomiting  oxyCODONE    IR 5 milliGRAM(s) Oral once PRN Moderate Pain (4 - 6)      Vital Signs Last 24 Hrs  T(C): 36.3 (15 Feb 2023 14:15), Max: 36.4 (15 Feb 2023 10:59)  T(F): 97.4 (15 Feb 2023 14:15), Max: 97.6 (15 Feb 2023 10:59)  HR: 58 (15 Feb 2023 15:00) (50 - 66)  BP: 129/68 (15 Feb 2023 15:00) (129/68 - 145/84)    RR: 11 (15 Feb 2023 15:00) (10 - 14)  SpO2: 97% (15 Feb 2023 15:00) (97% - 100%)      PHYSICAL EXAM:    GENERAL: Comfortable, no acute distress   HEAD:  Normocephalic, atraumatic  EYES: EOMI, PERRLA  HEENT: Moist mucous membranes  NECK: Supple, No JVD  NERVOUS SYSTEM:  Sedated  CHEST/LUNG: Clear to auscultation bilaterally  HEART: Regular rate and rhythm  ABDOMEN: Soft, non tender, Nondistended, Bowel sounds present  GENITOURINARY: due to void, no palpable bladder  EXTREMITIES:   No clubbing, cyanosis, mild edema noted to shoulder region, +2 radial pulse, right hand warm, cap refill <3 seconds  MUSCULOSKELETAL- Drsg CDI to right shoulder with ice applied  SKIN-no rash          LABS:                        12.4   11.59 )-----------( 177      ( 15 Feb 2023 14:39 )             36.9         IMPRESSION: 73 y/o M with above pmh a/w:  # right shoulder OA  # S/P reverse right total shoulder replacement  POD#0  pain control  PT eval  Bowel regimen  IVF's  Finish ABXs  DASH diet  PPI  VTE prophylaxis  monitor CBC/BMP    #Paroxysmal AFIB  as per cardio recs, continue ASA  resume Eliquis when ok with ortho  patient noted to be in NSR in PACU    # HTN  continue BB/CCB  pt at high risk for fluctuations in B/P 2/2 anesthesia, pain, hypovolemia and use of narcotics, placing pt at high risk for MI/CVA  monitor B/P closely  adjust anti-htn's accordingly    # HLD  continue statin    #BESS on CPAP at home  continuous pulse oximetry ordered  monitor 02 sats, supplement o2 to maintain sat >92%    #Leukocytosis  likely reactive  continue to monitor WBC    # Vte prophylaxis  Venodynes  INC mobility  resume Eliquis when ok with surgery      Thank you for the consult, will follow

## 2023-02-15 NOTE — ASU PATIENT PROFILE, ADULT - FALL HARM RISK - UNIVERSAL INTERVENTIONS
Bed in lowest position, wheels locked, appropriate side rails in place/Call bell, personal items and telephone in reach/Instruct patient to call for assistance before getting out of bed or chair/Non-slip footwear when patient is out of bed/[place] to call system/Physically safe environment - no spills, clutter or unnecessary equipment/Purposeful Proactive Rounding/Room/bathroom lighting operational, light cord in reach

## 2023-02-15 NOTE — DISCHARGE NOTE PROVIDER - NSDCFUADDINST_GEN_ALL_CORE_FT
Discharge Instructions for Right Total Shoulder Arthroplasty:    PRESCRIPTIONS:   Pain- An eRx will be sent electronically to your pharmacy for  before DC or the day of.  Oxycodone is prescribed for severe pain. Wean off the Oxycodone as soon as your can. Additionally you can take Extra Strength Tylenol with the Oxycodone. A prescription for Celebrex 200mg PO twice daily for 30 days was sent to you pharmacy electronically to assist with pain control at home.     Stool Softener- We also recommend Miralax  stool softener which is over the counter to take daily while you are on Oxycodone.    If you need any refills on your medications, please call Dr. Serrato’s office when you have a 3-day supply left. Some of the medications (narcotics) cannot be called into a pharmacy and the prescription will need to be picked up at the office or mailed to you. If you were taking other medications for blood pressure, heart problems etc., you should discuss this with your family physician.     BANDAGE/INCISION CARE : A two layer water-resistant dressing is applied during surgery. You can remove the outer layer 3 days after surgery (Post-op Day 3) or sooner if it gets wet. The inner layer will stay on until your first post-operative appointment. The inner dressing is waterproof so you may shower immediately but avoid soaking in bathtub or swimming pool.     Once your dressing is removed, you may leave your incision open to air or apply a dry gauze dressing. The sutures are dissolvable and Dermabond (surgical glue) is applied. Skin glue will gradually come off as incision heals. If you notice redness, swelling or drainage around incision, contact Dr. Serrato’s office immediately.      SLING: For the first two to six weeks after your surgery, you will be wearing your sling the majority of time, coming out of it to complete exercises given to you by the therapists and Dr. Serrato upon your discharge from the hospital. After your first post-operative visit, Dr. Serrato may suggest you come out of your sling during the day to do activities in front of you.  Wearing the sling alerts others around you to be cautious and to avoid accidentally striking your arm. Also, during this time do not use your arm to pull or push yourself out of bed or out of a chair.     ACTIVITY: NO shoulder ROM. Bend and straighten elbow a few times daily so it doesn't get stiff. Walk Plenty. No sitting around. NO lifting with operative arm. See Detailed instructions in your packet.    ICE: Ice plenty and often during the first 2 weeks. Protect skin from direct ice using a towel or pillow case barrier.    PHYSICAL THERAPY: Dr. Serrato prefers that his patients do NOT do formal Physical Therapy after Total Shoulder Replacement. He has found that most patients do well with progressing their motion and strength through normal daily activities. He has also found that sometimes physical therapy pushes patients too much causing increased pain and discomfort. At each post-operative appointment, Dr. Serrato will teach you exercises to do own to work on range of motion. Although you should progress with some gentle range of motion, as demonstrated by Dr. Serrato, do not force any shoulder motions. Most importantly, do not let anyone (family members, physical therapists, etc) force your arm into uncomfortable positions.      DRIVING: You are NOT permitted to drive a car while taking narcotic medication or when you are wearing a sling. Do not drive until after your first follow-up visit with your surgeon.     RETURNING TO WORK: Returning to work depends on the demands of your job and should be discussed with Dr. Serrato before the surgery.     FOLLOW UP: Dr. Serrato or his PA will need to see you for multiple appointments after your surgery. Typically, Dr. Serrato orhis PA will see you back after 2 weeks, 6 weeks, 3 months and 6 months. You will be seen at 1 year, 2 years, 5 years and 10 year intervals thereafter.     **Please refer to patient post op info packet given to you at office visit for further details.

## 2023-02-15 NOTE — ASU PATIENT PROFILE, ADULT - NSICDXPASTMEDICALHX_GEN_ALL_CORE_FT
PAST MEDICAL HISTORY:  Arthritis     Deviated septum     Fatty liver     Gout     History of gastric polyp     HTN (hypertension)     Hyperlipidemia     Paroxysmal atrial fibrillation     Skin cancer (melanoma)     Sleep apnea

## 2023-02-15 NOTE — DISCHARGE NOTE PROVIDER - HOSPITAL COURSE
H&P:  Pt is a 72y Male    PAST MEDICAL & SURGICAL HISTORY:  Skin cancer (melanoma)      History of gastric polyp      Paroxysmal atrial fibrillation      HTN (hypertension)      Sleep apnea      Hyperlipidemia      Arthritis      Gout      Fatty liver      Deviated septum      Skin cancer (melanoma)      H/O total knee replacement, right      History of dental surgery      H/O colonoscopy      History of esophagogastroduodenoscopy (EGD)      Bilateral cataracts           Now s/p Right Total Shoulder Arthroplasty. Pt is afebrile with stable vital signs. Pain is controlled. Alert and Oriented. Exam reveals intact AIN/PIN, wrist flexion and wrist extension, 2+Radial Pulse. Dressing is clean and dry.    Hospital Course:  Patient presented to [facility] medically cleared for Right Total Shoulder Arthroplasty Replacement Surgery, having failed outpatient non-operative conservative management. Prophylactic antibiotics were started before the procedure and continued for 24 hours. They were admitted after surgery to the orthopedic floor.  There were no complications during the hospital stay.     Routine consult was obtained from Physical Therapy for gait training, NO shoulder ROM. Consult to Hospitalist for Medical Co-management. Patient was placed on anticoagulation post-operatively while inhouse.  Pertinent home medications were continued.  Daily labs were followed.      On POD 0 there were no overnight events and the pt was OOB with PT. POD 1, PT was continued, and anticipate POD 1 DC to home. The orthopedic Attending is aware and agrees.

## 2023-02-15 NOTE — DISCHARGE NOTE PROVIDER - NSDCMRMEDTOKEN_GEN_ALL_CORE_FT
allopurinol 300 mg oral tablet: 1 tab(s) orally once a day  amLODIPine 5 mg oral tablet: 1 tab(s) orally once a day  aspirin 81 mg oral tablet: orally once a day--directons per cardiology, to remain on without interuption   CoQ10: orally once a day  diclofenac 1% topical gel:   Eliquis 5 mg oral tablet: 1 tab(s) orally 2 times a day--directions per cardiology to hold 3 days prior to surgery   Fish Oil oral capsule: orally once a day  fluticasone 50 mcg/inh nasal spray: 1 spray(s) nasal once a day, As Needed  Garlic supplement: orally once a day  metoprolol succinate 100 mg oral tablet, extended release: 1 tab(s) orally once a day  Multiple Vitamins oral tablet: 1 tab(s) orally once a day  Probiotic Formula oral capsule: 1 cap(s) orally once a day  rosuvastatin 10 mg oral capsule: 1 cap(s) orally once a day  Sleep 3 supplement: orally once a day (at bedtime)  Vitamin B12: orally once a day  Vitamin C: orally once a day  Vitamin D3: orally once a day  Zinc: orally once a day   amLODIPine 5 mg oral tablet: 1 tab(s) orally once a day  aspirin 81 mg oral tablet: orally once a day--directons per cardiology, to remain on without interuption   CeleBREX 200 mg oral capsule: 1 cap(s) orally 2 times a day MDD:2  CoQ10: orally once a day  Eliquis 5 mg oral tablet: 1 tab(s) orally 2 times a day--directions per cardiology to hold 3 days prior to surgery   Fish Oil oral capsule: orally once a day  fluticasone 50 mcg/inh nasal spray: 1 spray(s) nasal once a day, As Needed  Garlic supplement: orally once a day  metoprolol succinate 100 mg oral tablet, extended release: 1 tab(s) orally once a day  MiraLax oral powder for reconstitution: 17 gram(s) orally once a day  as needed for constipation  Multiple Vitamins oral tablet: 1 tab(s) orally once a day  ondansetron 4 mg oral tablet: 1 tab(s) orally every 8 hours, As Needed for nausea MDD:3  oxyCODONE 5 mg oral tablet: 1 tab(s) orally every 4 hours as needed for severe pain; MDD:6  pantoprazole 40 mg oral delayed release tablet: 1 tab(s) orally once a day   Probiotic Formula oral capsule: 1 cap(s) orally once a day  senna oral tablet: 2 tab(s) orally once a day (at bedtime)   Sleep 3 supplement: orally once a day (at bedtime)  Tylenol Extra Strength 500 mg oral tablet: 2 tab(s) orally 3 times a day   Vitamin B12: orally once a day  Vitamin C: orally once a day  Vitamin D3: orally once a day  Zinc: orally once a day

## 2023-02-16 ENCOUNTER — TRANSCRIPTION ENCOUNTER (OUTPATIENT)
Age: 73
End: 2023-02-16

## 2023-02-16 VITALS
SYSTOLIC BLOOD PRESSURE: 132 MMHG | HEART RATE: 65 BPM | OXYGEN SATURATION: 97 % | RESPIRATION RATE: 18 BRPM | DIASTOLIC BLOOD PRESSURE: 61 MMHG | TEMPERATURE: 98 F

## 2023-02-16 LAB
ANION GAP SERPL CALC-SCNC: 5 MMOL/L — SIGNIFICANT CHANGE UP (ref 5–17)
BUN SERPL-MCNC: 20 MG/DL — SIGNIFICANT CHANGE UP (ref 7–23)
CALCIUM SERPL-MCNC: 8.4 MG/DL — LOW (ref 8.5–10.1)
CHLORIDE SERPL-SCNC: 105 MMOL/L — SIGNIFICANT CHANGE UP (ref 96–108)
CO2 SERPL-SCNC: 26 MMOL/L — SIGNIFICANT CHANGE UP (ref 22–31)
CREAT SERPL-MCNC: 0.79 MG/DL — SIGNIFICANT CHANGE UP (ref 0.5–1.3)
EGFR: 93 ML/MIN/1.73M2 — SIGNIFICANT CHANGE UP
GLUCOSE SERPL-MCNC: 107 MG/DL — HIGH (ref 70–99)
HCT VFR BLD CALC: 33 % — LOW (ref 39–50)
HGB BLD-MCNC: 11.3 G/DL — LOW (ref 13–17)
MCHC RBC-ENTMCNC: 29.5 PG — SIGNIFICANT CHANGE UP (ref 27–34)
MCHC RBC-ENTMCNC: 34.2 GM/DL — SIGNIFICANT CHANGE UP (ref 32–36)
MCV RBC AUTO: 86.2 FL — SIGNIFICANT CHANGE UP (ref 80–100)
PLATELET # BLD AUTO: 170 K/UL — SIGNIFICANT CHANGE UP (ref 150–400)
POTASSIUM SERPL-MCNC: 3.5 MMOL/L — SIGNIFICANT CHANGE UP (ref 3.5–5.3)
POTASSIUM SERPL-SCNC: 3.5 MMOL/L — SIGNIFICANT CHANGE UP (ref 3.5–5.3)
RBC # BLD: 3.83 M/UL — LOW (ref 4.2–5.8)
RBC # FLD: 14 % — SIGNIFICANT CHANGE UP (ref 10.3–14.5)
SODIUM SERPL-SCNC: 136 MMOL/L — SIGNIFICANT CHANGE UP (ref 135–145)
WBC # BLD: 13.97 K/UL — HIGH (ref 3.8–10.5)
WBC # FLD AUTO: 13.97 K/UL — HIGH (ref 3.8–10.5)

## 2023-02-16 PROCEDURE — 99213 OFFICE O/P EST LOW 20 MIN: CPT

## 2023-02-16 RX ADMIN — Medication 650 MILLIGRAM(S): at 03:16

## 2023-02-16 RX ADMIN — PANTOPRAZOLE SODIUM 40 MILLIGRAM(S): 20 TABLET, DELAYED RELEASE ORAL at 06:08

## 2023-02-16 RX ADMIN — Medication 650 MILLIGRAM(S): at 08:53

## 2023-02-16 RX ADMIN — Medication 100 MILLIGRAM(S): at 04:49

## 2023-02-16 RX ADMIN — OXYCODONE HYDROCHLORIDE 10 MILLIGRAM(S): 5 TABLET ORAL at 08:53

## 2023-02-16 RX ADMIN — Medication 300 MILLIGRAM(S): at 09:02

## 2023-02-16 RX ADMIN — CELECOXIB 200 MILLIGRAM(S): 200 CAPSULE ORAL at 09:01

## 2023-02-16 RX ADMIN — APIXABAN 5 MILLIGRAM(S): 2.5 TABLET, FILM COATED ORAL at 09:02

## 2023-02-16 RX ADMIN — Medication 100 MILLIGRAM(S): at 09:02

## 2023-02-16 NOTE — PROGRESS NOTE ADULT - SUBJECTIVE AND OBJECTIVE BOX
Orthopedics  POD 1 s/p R TSA    Patient seen and examined at bedside. Pain is controlled. Pt feeling well. No cp sob nausea or vomiting.    Vital Signs Last 24 Hrs  T(C): 36.6 (02-16-23 @ 04:02), Max: 36.9 (02-15-23 @ 19:32)  T(F): 97.8 (02-16-23 @ 04:02), Max: 98.5 (02-15-23 @ 19:32)  HR: 58 (02-16-23 @ 04:02) (50 - 84)  BP: 119/52 (02-16-23 @ 04:02) (118/56 - 145/84)  BP(mean): --  RR: 18 (02-16-23 @ 04:02) (10 - 18)  SpO2: 96% (02-16-23 @ 04:02) (95% - 100%)                            12.4   11.59 )-----------( 177      ( 15 Feb 2023 14:39 )             36.9       Exam:  Gen: NAD, resting comfortably  Dressing c/d/i; Shoulder immobilizer in place  +Ax/Msc/Rad/Uln/Med/AIN/PIN  SILT C5-T1  +Radial pulse 2+  Calf NTTP b/l  Compartments soft and compressible    A/P:  72yMale Stable POD 1 s/p R TSA    Medical comanagement appreciated  -FU AM labs  -NWB RUE in Shoulder Immobilizer  -Pain control PRN  -PT/OT  -Ppx ABX x24hrs  -DVT PE ppx: Xarelto  -Incentive spirometry  -Dispo pending PT recs but likely home  -Discussed with attending Dr. Serrato who agrees with plan
Orthopedics Post-op Check  POD 1 s/p R TSA    Patient seen and examined at bedside. Pain is controlled. Pt feeling well. No cp sob nausea or vomiting. Patient reports that his sensation has returned but the block has not fully worn off.    Vital Signs Last 24 Hrs  T(C): 36.9 (02-15-23 @ 19:32), Max: 36.9 (02-15-23 @ 19:32)  T(F): 98.5 (02-15-23 @ 19:32), Max: 98.5 (02-15-23 @ 19:32)  HR: 84 (02-15-23 @ 19:32) (50 - 84)  BP: 122/65 (02-15-23 @ 19:32) (122/65 - 145/84)  BP(mean): --  RR: 18 (02-15-23 @ 19:32) (10 - 18)  SpO2: 95% (02-15-23 @ 19:32) (95% - 100%)                          12.4   11.59 )-----------( 177      ( 15 Feb 2023 14:39 )             36.9         Exam:  Gen: NAD, resting comfortably  Dressing c/d/i; Shoulder immobilizer in place  Able to flex thumb at IP joint; Otherwise unable to extend wrist/fingers/thumb or aBduct fingers - likely secondary to block and will continue to monitor  SILT C5-T1  +Radial pulse 2+  Calf NTTP b/l  Compartments soft and compressible    A/P:  72yMale Stable POD 1  s/p R TSA    Medical comanagement appreciated  -FU AM labs  -NWB RUE in Shoulder Immobilizer  - No Forward Flexion, ER to Neutral, IR to chest wall  -Pain control as needed  -PT/OT  -Ppx ABX x24hrs  -DVT PE ppx: A81 & Eliquis  -Incentive spirometry  -Dispo pending PT recs  -Will discuss with attending Dr. Serrato and advise if any changes to plan
  PCP: Annabella Pires    CHIEF COMPLAINT: Right shoulder OA    HISTORY OF THE PRESENT ILLNESS: this is a 71 y/o M with PMH Paroxysmal AFIB (on Eliquis and ASA), BESS (home CPAP), HLD, HTN, melanoma, and OA of his right shoulder, with progressive pain, decreasing ROM, and impacting his ADLs, failed the usual modalities for pain and is now s/p reverse right total shoulder replacement.        pt seen ambulating with PT  and again in room,  awake, alert comfortable, having mild post op pain,  cee po, no cp or sob  + voiding, cee po    REVIEW OF SYSTEMS: all 10 systems reviewed and are negative, with pertinent positives documented in HPI    MEDICATIONS  (STANDING):  acetaminophen     Tablet .. 650 milliGRAM(s) Oral every 6 hours  allopurinol 300 milliGRAM(s) Oral daily  amLODIPine   Tablet 5 milliGRAM(s) Oral daily  apixaban 5 milliGRAM(s) Oral every 12 hours  aspirin enteric coated 81 milliGRAM(s) Oral daily  atorvastatin 40 milliGRAM(s) Oral at bedtime  celecoxib 200 milliGRAM(s) Oral every 12 hours  lactated ringers. 1000 milliLiter(s) (75 mL/Hr) IV Continuous <Continuous>  metoprolol succinate  milliGRAM(s) Oral daily  pantoprazole    Tablet 40 milliGRAM(s) Oral before breakfast  tranexamic acid IVPB 1000 milliGRAM(s) IV Intermittent once  tranexamic acid IVPB 1000 milliGRAM(s) IV Intermittent once    MEDICATIONS  (PRN):  HYDROmorphone  Injectable 0.5 milliGRAM(s) SubCutaneous every 4 hours PRN Severe Pain (7 - 10)  ondansetron Injectable 4 milliGRAM(s) IV Push every 6 hours PRN Nausea and/or Vomiting  oxyCODONE    IR 5 milliGRAM(s) Oral every 3 hours PRN Mild Pain (1 - 3)  oxyCODONE    IR 10 milliGRAM(s) Oral every 3 hours PRN Moderate Pain (4 - 6)    Vital Signs Last 24 Hrs  T(C): 36.4 (02-16-23 @ 08:52), Max: 36.9 (02-15-23 @ 19:32)  T(F): 97.5 (02-16-23 @ 08:52), Max: 98.5 (02-15-23 @ 19:32)  HR: 65 (02-16-23 @ 08:52) (58 - 84)  BP: 132/61 (02-16-23 @ 08:52) (118/56 - 137/73)  BP(mean): --  RR: 18 (02-16-23 @ 08:52) (18 - 18)  SpO2: 97% (02-16-23 @ 08:52) (95% - 98%)    PHYSICAL EXAM:    GENERAL: Comfortable, no acute distress   HEAD:  Normocephalic, atraumatic  EYES: EOMI, PERRLA  HEENT: Moist mucous membranes  NECK: Supple, No JVD  NERVOUS SYSTEM:  Sedated  CHEST/LUNG: Clear to auscultation bilaterally  HEART: Regular rate and rhythm  ABDOMEN: Soft, non tender, Nondistended, Bowel sounds present  GENITOURINARY: due to void, no palpable bladder  EXTREMITIES:   No clubbing, cyanosis, mild edema noted to shoulder region, +2 radial pulse, right hand warm, cap refill <3 seconds  MUSCULOSKELETAL- Drsg CDI to right shoulder with ice applied, + sling  SKIN-no rash          LABS:                                  11.3   13.97 )-----------( 170      ( 16 Feb 2023 07:32 )             33.0       02-16    136  |  105  |  20  ----------------------------<  107<H>  3.5   |  26  |  0.79    Ca    8.4<L>      16 Feb 2023 07:32            IMPRESSION: 71 y/o M with above pmh a/w:  # right shoulder OA  # S/P reverse right total shoulder replacement  POD#0  pain control  PT eval  Bowel regimen  IVF's  Finish ABXs  DASH diet  PPI  VTE prophylaxis  monitor CBC/BMP    #Paroxysmal AFIB  as per cardio recs, continue ASA  resume Eliquis when ok with ortho  patient noted to be in NSR in PACU    # HTN  continue BB/CCB  pt at high risk for fluctuations in B/P 2/2 anesthesia, pain, hypovolemia and use of narcotics, placing pt at high risk for MI/CVA  monitor B/P closely  adjust anti-htn's accordingly    # HLD  continue statin    #BESS on CPAP at home  continuous pulse oximetry ordered  monitor 02 sats, supplement o2 to maintain sat >92%    #Leukocytosis  likely reactive  continue to monitor WBC    # Vte prophylaxis  Venodynes  INC mobility  resume Eliquis when ok with surgery      Thank you for the consult, will follow

## 2023-02-16 NOTE — PHYSICAL THERAPY INITIAL EVALUATION ADULT - GENERAL OBSERVATIONS, REHAB EVAL
The pt was pleasant and cooperative and eager to get OOB with PT, the pt was received on 2N, supine with right UE shoulder immobilizer in place and bilateral SCDs secured.

## 2023-02-16 NOTE — PHYSICAL THERAPY INITIAL EVALUATION ADULT - PERTINENT HX OF CURRENT PROBLEM, REHAB EVAL
72 y.o WD, WN male presents to PST with hx of right shoulder pain. Patient reports since this past summer his right shoulder pain has worsened with decreasing ROM. His pain is impacting his activities of daily living,    PT Evaluate and Treat- Non Weight bearing right Upper Extremity in shoulder immobilizer at all times, gait training only

## 2023-02-16 NOTE — PHYSICAL THERAPY INITIAL EVALUATION ADULT - CRITERIA FOR SKILLED THERAPEUTIC INTERVENTIONS
No Need for continued skilled acute care PT services at present./anticipated discharge recommendation

## 2023-02-16 NOTE — DISCHARGE NOTE NURSING/CASE MANAGEMENT/SOCIAL WORK - PATIENT PORTAL LINK FT
You can access the FollowMyHealth Patient Portal offered by [facility] by registering at the following website: [email or web address]. By joining [facility]’s FollowMyHealth portal, you will also be able to view your health information using other applications (apps) compatible with our system.

## 2023-02-16 NOTE — PHYSICAL THERAPY INITIAL EVALUATION ADULT - ADDITIONAL COMMENTS
1-2 steps to enter the home which is a ranch style home with not other necessary steps inside the home.

## 2023-02-21 LAB — SURGICAL PATHOLOGY STUDY: SIGNIFICANT CHANGE UP

## 2023-02-23 DIAGNOSIS — Z79.01 LONG TERM (CURRENT) USE OF ANTICOAGULANTS: ICD-10-CM

## 2023-02-23 DIAGNOSIS — M19.011 PRIMARY OSTEOARTHRITIS, RIGHT SHOULDER: ICD-10-CM

## 2023-02-23 DIAGNOSIS — I10 ESSENTIAL (PRIMARY) HYPERTENSION: ICD-10-CM

## 2023-02-23 DIAGNOSIS — I48.0 PAROXYSMAL ATRIAL FIBRILLATION: ICD-10-CM

## 2023-02-23 DIAGNOSIS — Z85.820 PERSONAL HISTORY OF MALIGNANT MELANOMA OF SKIN: ICD-10-CM

## 2023-02-23 DIAGNOSIS — G47.33 OBSTRUCTIVE SLEEP APNEA (ADULT) (PEDIATRIC): ICD-10-CM

## 2023-02-23 DIAGNOSIS — E78.5 HYPERLIPIDEMIA, UNSPECIFIED: ICD-10-CM

## 2023-02-23 DIAGNOSIS — Z87.891 PERSONAL HISTORY OF NICOTINE DEPENDENCE: ICD-10-CM

## 2023-02-23 DIAGNOSIS — Z79.82 LONG TERM (CURRENT) USE OF ASPIRIN: ICD-10-CM

## 2023-02-23 DIAGNOSIS — Z96.651 PRESENCE OF RIGHT ARTIFICIAL KNEE JOINT: ICD-10-CM

## 2023-02-23 DIAGNOSIS — E66.09 OTHER OBESITY DUE TO EXCESS CALORIES: ICD-10-CM

## 2023-02-23 DIAGNOSIS — Z88.1 ALLERGY STATUS TO OTHER ANTIBIOTIC AGENTS STATUS: ICD-10-CM

## 2023-02-23 DIAGNOSIS — M10.9 GOUT, UNSPECIFIED: ICD-10-CM

## 2023-03-01 ENCOUNTER — APPOINTMENT (OUTPATIENT)
Dept: ORTHOPEDIC SURGERY | Facility: CLINIC | Age: 73
End: 2023-03-01
Payer: MEDICARE

## 2023-03-01 PROCEDURE — 99024 POSTOP FOLLOW-UP VISIT: CPT

## 2023-03-01 PROCEDURE — 73030 X-RAY EXAM OF SHOULDER: CPT | Mod: 26,RT

## 2023-03-01 NOTE — HISTORY OF PRESENT ILLNESS
[___ Days Post Op] : post op day #[unfilled] [3] : the patient reports pain that is 3/10 in severity [Clean/Dry/Intact] : clean, dry and intact [Neuro Intact] : an unremarkable neurological exam [Vascular Intact] : ~T peripheral vascular exam normal [Xray (Date:___)] : [unfilled] Xray -  [Chills] : no chills [Fever] : no fever [Nausea] : no nausea [Vomiting] : no vomiting [de-identified] : spo R reverse shoulder replacement.. DOS: 2/15/2023 [de-identified] : SHAUN presents today out of sling.  He denies fever chills, redness around/near incision site and numbness/tingling. He denies nausea/vomiting and admits to their appetite being back to normal since surgery. Patient has been utilizing Tylenol for pain with relief. They longer require narcotics.  He is not using his sling.  He is doing well overall.  He is here today with his wife.\par  [de-identified] : On exam he is alert and oriented and in no apparent distress.  Right shoulder is examined his incision is well-healed.  The Prineo dressing was removed and the area was cleansed with alcohol.  There are no signs of infection.  He has active and passive forward flexion to 95 degrees external rotation to 15 degrees.  His compartments are soft and nontender.  He has 2+ capillary refill and sensations intact distally. [de-identified] : 2 views of R shoulder were performed today and available for me to review. Results were discussed with the patient. They demonstrate a reverse total shoulder arthroplasty in good position and alignment. [de-identified] : I had a discussion with the patient regarding his condition.  We discussed the operative and postoperative course.  His x-rays were reviewed with him today.  I cautioned him against doing too much too soon.  I advised him to use a sling.  He should follow the rehab protocol.  No heavy lifting pushing or pulling.  He will follow-up in 4 weeks for repeat evaluation.  We did discuss the potential for dislocation with noncompliance.  All questions were answered

## 2023-03-08 ENCOUNTER — RX RENEWAL (OUTPATIENT)
Age: 73
End: 2023-03-08

## 2023-04-10 ENCOUNTER — NON-APPOINTMENT (OUTPATIENT)
Age: 73
End: 2023-04-10

## 2023-04-10 ENCOUNTER — APPOINTMENT (OUTPATIENT)
Dept: ORTHOPEDIC SURGERY | Facility: CLINIC | Age: 73
End: 2023-04-10
Payer: MEDICARE

## 2023-04-10 VITALS — BODY MASS INDEX: 28.14 KG/M2 | WEIGHT: 190 LBS | HEIGHT: 69 IN

## 2023-04-10 PROCEDURE — 99024 POSTOP FOLLOW-UP VISIT: CPT

## 2023-04-10 NOTE — HISTORY OF PRESENT ILLNESS
[3] : the patient reports pain that is 3/10 in severity [Clean/Dry/Intact] : clean, dry and intact [Neuro Intact] : an unremarkable neurological exam [Vascular Intact] : ~T peripheral vascular exam normal [Xray (Date:___)] : [unfilled] Xray -  [Doing Well] : is doing well [No Sign of Infection] : is showing no signs of infection [Adequate Pain Control] : has adequate pain control [Chills] : no chills [Fever] : no fever [Nausea] : no nausea [Vomiting] : no vomiting [de-identified] : spo R reverse shoulder replacement.. DOS: 2/15/2023 [de-identified] : SHAUN presents today for spo R reverse shoulder replacement.. DOS: 2/15/2023. He notes some pain after shaking his son-in-laws hand about 2 weeks ago. He endorses pain over the biceps muscle. Patient would like to know when he can play golf again. [de-identified] : On exam he is alert and oriented and in no apparent distress.  Right shoulder is examined his incision is well-healed.  The Prineo dressing was removed and the area was cleansed with alcohol.  There are no signs of infection.  He has active and passive forward flexion to 150 degrees external rotation to 30 degrees and IR to backpocket.  His compartments are soft and nontender.  He has 2+ capillary refill and sensations intact distally. [de-identified] : 3 view xrays of pt Right (R) shoulder were performed today and available for me to review. They demonstrate adequate position of reverse total shoulder arthroplasty. [de-identified] : I had a discussion with the patient regarding his condition. Patient should begin physical therapy. He should refrain from golf at this time. Patient was given prescription of formal physical therapy that he will perform 2x/wk for 6-8 wks. Patient will follow up in 6-8 wks for repeat clinical assessment. All questions were answered and the patient verbalized understanding. The patient is in agreement with this treatment plan.

## 2023-04-10 NOTE — ADDENDUM
[FreeTextEntry1] : Documented by Bruna Elder acting as a scribe for Dr. Serrato and Lavelle Stratton PA-C on 04/10/2023.   All medical record entries made by the Scribe were at my, Dr. Serrato, and Lavelle Stratton's, direction and personally dictated by me on 04/10/2023. I have reviewed the chart and agree that the record accurately reflects my personal performance of the history, physical exam, procedure and imaging.

## 2023-05-02 NOTE — PLAN
What Is The Reason For Today's Visit?: Full Body Skin Examination What Is The Reason For Today's Visit? (Being Monitored For X): concerning skin lesions on an annual basis [FreeTextEntry1] : # Upon review of patient's most recent blood work and blood pressure patient advised he can STOP taking diuretic\par # Reduce Niacin to 1 tablet \par \par # Encouraged patient to continue with daily walks and sobriety\par # Advised patient to pursue intermittent fasting to aid with weight loss\par \par # Follow up in 2-4 weeks for cataracts MCA\par - then follow up in 3 months for f/u

## 2023-05-10 ENCOUNTER — APPOINTMENT (OUTPATIENT)
Dept: ORTHOPEDIC SURGERY | Facility: CLINIC | Age: 73
End: 2023-05-10

## 2023-05-15 ENCOUNTER — APPOINTMENT (OUTPATIENT)
Dept: ORTHOPEDIC SURGERY | Facility: CLINIC | Age: 73
End: 2023-05-15
Payer: MEDICARE

## 2023-05-15 VITALS — HEIGHT: 69 IN | WEIGHT: 190 LBS | BODY MASS INDEX: 28.14 KG/M2

## 2023-05-15 PROCEDURE — 99024 POSTOP FOLLOW-UP VISIT: CPT

## 2023-05-15 NOTE — ADDENDUM
[FreeTextEntry1] : Documented by Bruna Elder acting as a scribe for Dr. Serrato and Lavelle Stratton PA-C on 05/15/2023.   All medical record entries made by the Scribe were at my, Dr. Serrato, and Lavelle Stratton's, direction and personally dictated by me on 05/15/2023. I have reviewed the chart and agree that the record accurately reflects my personal performance of the history, physical exam, procedure and imaging.

## 2023-05-15 NOTE — HISTORY OF PRESENT ILLNESS
[3] : the patient reports pain that is 3/10 in severity [Clean/Dry/Intact] : clean, dry and intact [Neuro Intact] : an unremarkable neurological exam [Vascular Intact] : ~T peripheral vascular exam normal [Doing Well] : is doing well [No Sign of Infection] : is showing no signs of infection [Adequate Pain Control] : has adequate pain control [Chills] : no chills [Fever] : no fever [Nausea] : no nausea [Vomiting] : no vomiting [de-identified] : spo R reverse shoulder replacement.. DOS: 2/15/2023 [de-identified] : SHAUN presents today for spo R reverse shoulder replacement.. DOS: 2/15/2023. He has been doing PT and is happy with his progress. He has putted but has not hit  a regular club. [de-identified] : On exam he is alert and oriented and in no apparent distress.  Right shoulder is examined his incision is well-healed.    There are no signs of infection.  He has active and active/passive forward flexion to 150 degrees external rotation to 30 degrees and IR to backpocket.  His compartments are soft and nontender.  He has 2+ capillary refill and sensations intact distally. [de-identified] : I had a discussion with the patient regarding his condition. Patient should continue physical therapy. He can begin to slowly transition back to playing golf. He should follow up with me in February.  All questions were answered and the patient verbalized understanding. The patient is in agreement with this treatment plan.

## 2023-05-16 ENCOUNTER — NON-APPOINTMENT (OUTPATIENT)
Age: 73
End: 2023-05-16

## 2023-06-08 ENCOUNTER — RX RENEWAL (OUTPATIENT)
Age: 73
End: 2023-06-08

## 2023-06-15 ENCOUNTER — RX RENEWAL (OUTPATIENT)
Age: 73
End: 2023-06-15

## 2023-06-29 NOTE — INPATIENT CERTIFICATION FOR MEDICARE PATIENTS - THE STATUS OF COMORBIDITIES.
2. The status of comorbities. (See ED/admit documents) Patient with one or more new problems requiring additional work-up/treatment.

## 2023-07-24 ENCOUNTER — OFFICE (OUTPATIENT)
Dept: URBAN - METROPOLITAN AREA CLINIC 12 | Facility: CLINIC | Age: 73
Setting detail: OPHTHALMOLOGY
End: 2023-07-24
Payer: MEDICARE

## 2023-07-24 DIAGNOSIS — H43.393: ICD-10-CM

## 2023-07-24 DIAGNOSIS — H35.3131: ICD-10-CM

## 2023-07-24 DIAGNOSIS — H26.493: ICD-10-CM

## 2023-07-24 DIAGNOSIS — H01.001: ICD-10-CM

## 2023-07-24 DIAGNOSIS — Z96.1: ICD-10-CM

## 2023-07-24 DIAGNOSIS — H01.004: ICD-10-CM

## 2023-07-24 PROCEDURE — 92014 COMPRE OPH EXAM EST PT 1/>: CPT | Performed by: OPTOMETRIST

## 2023-07-24 PROCEDURE — 92250 FUNDUS PHOTOGRAPHY W/I&R: CPT | Performed by: OPTOMETRIST

## 2023-07-24 ASSESSMENT — REFRACTION_MANIFEST
OS_CYLINDER: -0.25
OD_VA1: 20/20
OD_CYLINDER: 0.00
OD_CYLINDER: -0.25
OD_VA1: 20/20
OS_AXIS: 025
OS_CYLINDER: -0.25
OS_VA1: 20/20
OD_SPHERE: +2.50
OS_SPHERE: +2.00
OD_AXIS: 100
OS_AXIS: 030
OD_SPHERE: +2.25
OS_VA1: 20/25
OS_SPHERE: +2.25

## 2023-07-24 ASSESSMENT — KERATOMETRY
OS_AXISANGLE_DEGREES: 092
OD_AXISANGLE_DEGREES: 074
OS_K1POWER_DIOPTERS: 43.00
OD_K2POWER_DIOPTERS: 43.25
OS_K2POWER_DIOPTERS: 43.75
OD_K1POWER_DIOPTERS: 42.75

## 2023-07-24 ASSESSMENT — SPHEQUIV_DERIVED
OS_SPHEQUIV: 2.125
OS_SPHEQUIV: 0.125
OS_SPHEQUIV: 1.875
OD_SPHEQUIV: 2.5
OD_SPHEQUIV: 2.125
OD_SPHEQUIV: 0.5

## 2023-07-24 ASSESSMENT — CONFRONTATIONAL VISUAL FIELD TEST (CVF)
OD_FINDINGS: FULL
OS_FINDINGS: FULL

## 2023-07-24 ASSESSMENT — REFRACTION_CURRENTRX
OD_ADD: +2.50
OS_VPRISM_DIRECTION: PROGS
OD_VPRISM_DIRECTION: PROGS
OD_OVR_VA: 20/
OD_OVR_VA: 20/
OS_OVR_VA: 20/
OD_SPHERE: +1.75
OD_ADD: +2.50
OS_OVR_VA: 20/
OS_ADD: +2.50
OS_VPRISM_DIRECTION: SV
OS_CYLINDER: SPH
OD_VPRISM_DIRECTION: SV
OS_ADD: +2.50
OS_SPHERE: +1.50
OD_CYLINDER: SPH

## 2023-07-24 ASSESSMENT — REFRACTION_AUTOREFRACTION
OD_CYLINDER: -0.50
OD_AXIS: 093
OS_SPHERE: +0.50
OS_AXIS: 028
OD_SPHERE: +0.75
OS_CYLINDER: -0.75

## 2023-07-24 ASSESSMENT — AXIALLENGTH_DERIVED
OS_AL: 22.835
OS_AL: 22.9266
OD_AL: 23.5808
OS_AL: 23.5891
OD_AL: 22.8272
OD_AL: 22.9648

## 2023-07-24 ASSESSMENT — TONOMETRY
OS_IOP_MMHG: 18
OD_IOP_MMHG: 14

## 2023-07-24 ASSESSMENT — VISUAL ACUITY
OD_BCVA: 20/25-2
OS_BCVA: 20/25-1

## 2023-07-24 ASSESSMENT — LID EXAM ASSESSMENTS
OD_BLEPHARITIS: 1+
OS_BLEPHARITIS: 1+

## 2023-07-26 ENCOUNTER — APPOINTMENT (OUTPATIENT)
Dept: FAMILY MEDICINE | Facility: CLINIC | Age: 73
End: 2023-07-26
Payer: MEDICARE

## 2023-07-26 VITALS
HEIGHT: 69 IN | HEART RATE: 60 BPM | SYSTOLIC BLOOD PRESSURE: 122 MMHG | BODY MASS INDEX: 27.4 KG/M2 | OXYGEN SATURATION: 98 % | WEIGHT: 185 LBS | DIASTOLIC BLOOD PRESSURE: 70 MMHG

## 2023-07-26 PROBLEM — E78.5 HYPERLIPIDEMIA, UNSPECIFIED: Chronic | Status: ACTIVE | Noted: 2023-02-07

## 2023-07-26 PROBLEM — C43.9 MALIGNANT MELANOMA OF SKIN, UNSPECIFIED: Chronic | Status: ACTIVE | Noted: 2023-02-07

## 2023-07-26 PROBLEM — I10 ESSENTIAL (PRIMARY) HYPERTENSION: Chronic | Status: ACTIVE | Noted: 2023-02-07

## 2023-07-26 PROBLEM — I48.0 PAROXYSMAL ATRIAL FIBRILLATION: Chronic | Status: ACTIVE | Noted: 2023-02-07

## 2023-07-26 PROBLEM — K76.0 FATTY (CHANGE OF) LIVER, NOT ELSEWHERE CLASSIFIED: Chronic | Status: ACTIVE | Noted: 2023-02-07

## 2023-07-26 PROBLEM — G47.30 SLEEP APNEA, UNSPECIFIED: Chronic | Status: ACTIVE | Noted: 2023-02-07

## 2023-07-26 PROBLEM — J34.2 DEVIATED NASAL SEPTUM: Chronic | Status: ACTIVE | Noted: 2023-02-07

## 2023-07-26 PROBLEM — M19.90 UNSPECIFIED OSTEOARTHRITIS, UNSPECIFIED SITE: Chronic | Status: ACTIVE | Noted: 2023-02-07

## 2023-07-26 PROBLEM — Z87.19 PERSONAL HISTORY OF OTHER DISEASES OF THE DIGESTIVE SYSTEM: Chronic | Status: ACTIVE | Noted: 2023-02-07

## 2023-07-26 PROBLEM — M10.9 GOUT, UNSPECIFIED: Chronic | Status: ACTIVE | Noted: 2023-02-07

## 2023-07-26 PROCEDURE — 99213 OFFICE O/P EST LOW 20 MIN: CPT | Mod: 25

## 2023-07-26 RX ADMIN — Medication MG/ML: at 00:00

## 2023-07-26 NOTE — PHYSICAL EXAM
[Normal] : normal rate, regular rhythm, normal S1 and S2 and no murmur heard [Normal Gait] : normal gait [Normal Affect] : the affect was normal [Normal Insight/Judgement] : insight and judgment were intact [de-identified] : left forearm and hand edema, +2 peripheal pulses, normal capillary refill, warm to touch, swelling up into elbow, normal upper arm

## 2023-07-26 NOTE — PLAN
[FreeTextEntry1] : Prednisone 40mg IM \par Start prednisone 20mg x 5 days\par Start benadryl 50mg now and then benadryl 25mg q2 hours xd 24 hours \par Encourage cold compress\par Follow up tomorrow \par If numbness/tingling, increased/spreading of swelling go to ED

## 2023-07-26 NOTE — HISTORY OF PRESENT ILLNESS
[FreeTextEntry8] : 74yo M presents with left arm swelling s/p wasp sting x 1 day. Pt reports he was stung by a wasp yesterday and by nighttime the hand and forearm swelled up until the elbow. Pt reports itching and tightness but no numbness/tingling in the hand. Pt reports he took a benadryl but no improvement in the swelling. Pt denies any itching in mouth/throat, tongue or throat swelling, difficulty swallowing or breathing.

## 2023-07-27 ENCOUNTER — APPOINTMENT (OUTPATIENT)
Dept: FAMILY MEDICINE | Facility: CLINIC | Age: 73
End: 2023-07-27
Payer: MEDICARE

## 2023-07-27 VITALS
HEIGHT: 69 IN | BODY MASS INDEX: 27.4 KG/M2 | SYSTOLIC BLOOD PRESSURE: 126 MMHG | OXYGEN SATURATION: 98 % | HEART RATE: 65 BPM | WEIGHT: 185 LBS | DIASTOLIC BLOOD PRESSURE: 78 MMHG

## 2023-07-27 DIAGNOSIS — T78.40XA ALLERGY, UNSPECIFIED, INITIAL ENCOUNTER: ICD-10-CM

## 2023-07-27 PROCEDURE — 99213 OFFICE O/P EST LOW 20 MIN: CPT

## 2023-07-27 NOTE — PLAN
[FreeTextEntry1] : Continue prednsione 20mg\par continue benadryl today\par if worsening swelling or redness return to office

## 2023-07-27 NOTE — HISTORY OF PRESENT ILLNESS
[FreeTextEntry1] : Follow up  [de-identified] : 74yo M presents s/p wasp sting for follow up. Pt had IM prednisone 40mg yesterday in office, took benadryl for 24 hours, and started his prednisone 20mg at home. Pt reports significant improvement in the forearm, elbow and hand. Pt is able to move his arm easier and feels like the tightness is less.

## 2023-07-27 NOTE — PHYSICAL EXAM
[Normal] : normal rate, regular rhythm, normal S1 and S2 and no murmur heard [Normal Gait] : normal gait [de-identified] : slight swelling in left hand and forearm +2 peripheral pulses

## 2023-08-01 ENCOUNTER — APPOINTMENT (OUTPATIENT)
Dept: FAMILY MEDICINE | Facility: CLINIC | Age: 73
End: 2023-08-01
Payer: MEDICARE

## 2023-08-01 VITALS
HEIGHT: 69 IN | BODY MASS INDEX: 28.58 KG/M2 | SYSTOLIC BLOOD PRESSURE: 120 MMHG | OXYGEN SATURATION: 98 % | WEIGHT: 193 LBS | DIASTOLIC BLOOD PRESSURE: 70 MMHG | HEART RATE: 68 BPM

## 2023-08-01 DIAGNOSIS — R10.9 UNSPECIFIED ABDOMINAL PAIN: ICD-10-CM

## 2023-08-01 DIAGNOSIS — R23.2 FLUSHING: ICD-10-CM

## 2023-08-01 DIAGNOSIS — M25.511 PAIN IN RIGHT SHOULDER: ICD-10-CM

## 2023-08-01 DIAGNOSIS — R73.9 HYPERGLYCEMIA, UNSPECIFIED: ICD-10-CM

## 2023-08-01 DIAGNOSIS — G47.33 OBSTRUCTIVE SLEEP APNEA (ADULT) (PEDIATRIC): ICD-10-CM

## 2023-08-01 DIAGNOSIS — Z87.39 PERSONAL HISTORY OF OTHER DISEASES OF THE MUSCULOSKELETAL SYSTEM AND CONNECTIVE TISSUE: ICD-10-CM

## 2023-08-01 DIAGNOSIS — Z86.2 PERSONAL HISTORY OF DISEASES OF THE BLOOD AND BLOOD-FORMING ORGANS AND CERTAIN DISORDERS INVOLVING THE IMMUNE MECHANISM: ICD-10-CM

## 2023-08-01 DIAGNOSIS — Z00.00 ENCOUNTER FOR GENERAL ADULT MEDICAL EXAMINATION W/OUT ABNORMAL FINDINGS: ICD-10-CM

## 2023-08-01 DIAGNOSIS — G45.3 AMAUROSIS FUGAX: ICD-10-CM

## 2023-08-01 DIAGNOSIS — H26.9 UNSPECIFIED CATARACT: ICD-10-CM

## 2023-08-01 DIAGNOSIS — I10 ESSENTIAL (PRIMARY) HYPERTENSION: ICD-10-CM

## 2023-08-01 DIAGNOSIS — E78.5 HYPERLIPIDEMIA, UNSPECIFIED: ICD-10-CM

## 2023-08-01 DIAGNOSIS — T63.461A TOXIC EFFECT OF VENOM OF WASPS, ACCIDENTAL (UNINTENTIONAL), INITIAL ENCOUNTER: ICD-10-CM

## 2023-08-01 DIAGNOSIS — M19.011 PRIMARY OSTEOARTHRITIS, RIGHT SHOULDER: ICD-10-CM

## 2023-08-01 DIAGNOSIS — M75.00 ADHESIVE CAPSULITIS OF UNSPECIFIED SHOULDER: ICD-10-CM

## 2023-08-01 DIAGNOSIS — M19.019 PRIMARY OSTEOARTHRITIS, UNSPECIFIED SHOULDER: ICD-10-CM

## 2023-08-01 DIAGNOSIS — M10.9 GOUT, UNSPECIFIED: ICD-10-CM

## 2023-08-01 DIAGNOSIS — I48.0 PAROXYSMAL ATRIAL FIBRILLATION: ICD-10-CM

## 2023-08-01 DIAGNOSIS — R39.9 UNSPECIFIED SYMPTOMS AND SIGNS INVOLVING THE GENITOURINARY SYSTEM: ICD-10-CM

## 2023-08-01 DIAGNOSIS — M27.69: ICD-10-CM

## 2023-08-01 DIAGNOSIS — R14.0 ABDOMINAL DISTENSION (GASEOUS): ICD-10-CM

## 2023-08-01 PROCEDURE — G0439: CPT

## 2023-08-01 RX ORDER — PREDNISONE 20 MG/1
20 TABLET ORAL DAILY
Qty: 5 | Refills: 0 | Status: COMPLETED | COMMUNITY
Start: 2023-07-26 | End: 2023-08-01

## 2023-08-01 NOTE — PLAN
[FreeTextEntry1] : - Previous labs discussed and reviewed - C/w current medication regimen - Continue regular exercise - Rec pt to schedule his colon cancer screening  - F/u in 6 months, sooner if needed

## 2023-08-01 NOTE — END OF VISIT
[FreeTextEntry3] : Medical record entries made by the scribe today 08/01/2023, were at my direction and personally dictated to them by me, Dr. Annabella Ornelas on 08/01/2023. I have reviewed the chart and agree that the record accurately reflects my personal performance of the history, physical exam, assessment, and plan.

## 2023-08-01 NOTE — HEALTH RISK ASSESSMENT
[Patient reported colonoscopy was normal] : Patient reported colonoscopy was normal [Good] : ~his/her~  mood as  good [Yes] : Yes [Monthly or less (1 pt)] : Monthly or less (1 point) [3 or 4 (1 pt)] : 3 or 4  (1 point) [Never (0 pts)] : Never (0 points) [No falls in past year] : Patient reported no falls in the past year [Former] : Former [10-14] : 10-14 [> 15 Years] : > 15 Years [ColonoscopyDate] : 10/20 [ColonoscopyComments] : Cologuard negative has a follow up at with Dr. Howe

## 2023-08-01 NOTE — HISTORY OF PRESENT ILLNESS
[de-identified] : SHAUN is a 73-year-old male here today for a complete annual wellness exam today August 1st, 2023. Pt reported has not fallen as he takes care to maintain balance. He denied any acute complaints today. Pt reported seeing CUONG Villarreal on July 26, 2023, due to left arm swelling after getting stung by a wasp. Pt stated total recovery and is no longer on prednisone 40mg. He reported he only drinks alcohol on special occasions. Pt confirmed he has been compliantly taking Eliquis 5MG prescribed by . Denies chest pain, SOB, fever, and chills. Confirmed normal bowel movement and urination.

## 2023-09-10 ENCOUNTER — RX RENEWAL (OUTPATIENT)
Age: 73
End: 2023-09-10

## 2023-10-22 NOTE — PHYSICAL EXAM
52 [No Acute Distress] : no acute distress [Well Nourished] : well nourished [Well Developed] : well developed [Well-Appearing] : well-appearing [Normal Sclera/Conjunctiva] : normal sclera/conjunctiva [PERRL] : pupils equal round and reactive to light [EOMI] : extraocular movements intact [Normal Outer Ear/Nose] : the outer ears and nose were normal in appearance [Normal Oropharynx] : the oropharynx was normal [No JVD] : no jugular venous distention [Supple] : supple [No Lymphadenopathy] : no lymphadenopathy [Thyroid Normal, No Nodules] : the thyroid was normal and there were no nodules present [No Respiratory Distress] : no respiratory distress  [Clear to Auscultation] : lungs were clear to auscultation bilaterally [No Accessory Muscle Use] : no accessory muscle use [Normal Rate] : normal rate  [Regular Rhythm] : with a regular rhythm [Normal S1, S2] : normal S1 and S2 [No Murmur] : no murmur heard [No Carotid Bruits] : no carotid bruits [No Abdominal Bruit] : a ~M bruit was not heard ~T in the abdomen [No Varicosities] : no varicosities [Pedal Pulses Present] : the pedal pulses are present [No Edema] : there was no peripheral edema [No Extremity Clubbing/Cyanosis] : no extremity clubbing/cyanosis [No Palpable Aorta] : no palpable aorta [Soft] : abdomen soft [Non Tender] : non-tender [Non-distended] : non-distended [No Masses] : no abdominal mass palpated [No HSM] : no HSM [Normal Bowel Sounds] : normal bowel sounds [Normal Posterior Cervical Nodes] : no posterior cervical lymphadenopathy [Normal Anterior Cervical Nodes] : no anterior cervical lymphadenopathy [No CVA Tenderness] : no CVA  tenderness [No Spinal Tenderness] : no spinal tenderness [No Joint Swelling] : no joint swelling [Grossly Normal Strength/Tone] : grossly normal strength/tone [No Rash] : no rash [Normal Gait] : normal gait [Coordination Grossly Intact] : coordination grossly intact [No Focal Deficits] : no focal deficits [Deep Tendon Reflexes (DTR)] : deep tendon reflexes were 2+ and symmetric [Normal Affect] : the affect was normal [Normal Insight/Judgement] : insight and judgment were intact

## 2023-11-10 ENCOUNTER — APPOINTMENT (OUTPATIENT)
Dept: FAMILY MEDICINE | Facility: CLINIC | Age: 73
End: 2023-11-10
Payer: MEDICARE

## 2023-11-10 ENCOUNTER — MED ADMIN CHARGE (OUTPATIENT)
Age: 73
End: 2023-11-10

## 2023-11-10 PROCEDURE — G0008: CPT

## 2023-11-10 PROCEDURE — 90662 IIV NO PRSV INCREASED AG IM: CPT

## 2023-11-14 ENCOUNTER — RX RENEWAL (OUTPATIENT)
Age: 73
End: 2023-11-14

## 2023-11-14 RX ORDER — FLUTICASONE PROPIONATE 50 UG/1
50 SPRAY, METERED NASAL
Qty: 16 | Refills: 6 | Status: ACTIVE | COMMUNITY
Start: 2023-11-14 | End: 1900-01-01

## 2023-12-11 ENCOUNTER — RX RENEWAL (OUTPATIENT)
Age: 73
End: 2023-12-11

## 2024-01-08 ENCOUNTER — NON-APPOINTMENT (OUTPATIENT)
Age: 74
End: 2024-01-08

## 2024-01-08 ENCOUNTER — APPOINTMENT (OUTPATIENT)
Dept: FAMILY MEDICINE | Facility: CLINIC | Age: 74
End: 2024-01-08
Payer: MEDICARE

## 2024-01-08 VITALS
WEIGHT: 208 LBS | OXYGEN SATURATION: 98 % | DIASTOLIC BLOOD PRESSURE: 80 MMHG | HEIGHT: 69 IN | HEART RATE: 70 BPM | SYSTOLIC BLOOD PRESSURE: 124 MMHG | BODY MASS INDEX: 30.81 KG/M2

## 2024-01-08 DIAGNOSIS — Z80.51 FAMILY HISTORY OF MALIGNANT NEOPLASM OF KIDNEY: ICD-10-CM

## 2024-01-08 DIAGNOSIS — K31.7 POLYP OF STOMACH AND DUODENUM: ICD-10-CM

## 2024-01-08 DIAGNOSIS — K21.9 GASTRO-ESOPHAGEAL REFLUX DISEASE W/OUT ESOPHAGITIS: ICD-10-CM

## 2024-01-08 DIAGNOSIS — Z01.818 ENCOUNTER FOR OTHER PREPROCEDURAL EXAMINATION: ICD-10-CM

## 2024-01-08 PROCEDURE — 99214 OFFICE O/P EST MOD 30 MIN: CPT

## 2024-01-08 PROCEDURE — 93000 ELECTROCARDIOGRAM COMPLETE: CPT

## 2024-01-08 PROCEDURE — G2211 COMPLEX E/M VISIT ADD ON: CPT

## 2024-01-08 NOTE — ADDENDUM
[FreeTextEntry1] : This note was written by Shana Danielle, acting as the  for Dr. Ornelas. This note accurately reflects the work and decisions made by Dr. Ornelas.

## 2024-01-08 NOTE — HISTORY OF PRESENT ILLNESS
[No Pertinent Cardiac History] : no history of aortic stenosis, atrial fibrillation, coronary artery disease, recent myocardial infarction, or implantable device/pacemaker [No Pertinent Pulmonary History] : no history of asthma, COPD, sleep apnea, or smoking [No Adverse Anesthesia Reaction] : no adverse anesthesia reaction in self or family member [Chronic Anticoagulation] : no chronic anticoagulation [Chronic Kidney Disease] : no chronic kidney disease [Diabetes] : no diabetes [(Patient denies any chest pain, claudication, dyspnea on exertion, orthopnea, palpitations or syncope)] : Patient denies any chest pain, claudication, dyspnea on exertion, orthopnea, palpitations or syncope [FreeTextEntry1] : Endoscopy  [FreeTextEntry2] : 1/23/24 [FreeTextEntry3] : Dr. Butler [FreeTextEntry4] : A 73-year-old male presents today for a re-op visit regarding an Endoscopy on 1/23/24 with Dr. Butler. Mood is good. Pt is currently taking Allopurinol 300mg, Amlodipine 5mg, Aspirin 81mg, Eliquis 5mg, Flonase, Losartan 50mg, Methylprednisolone 40mg/ml, Metoprolol ER 100mg, Rosuvastatin 10mg, and Vitamin C. Pt is due to follow-up with the cardiologist for a medical clearance and a stress test. Pt had a polyp in his intestine in 2016, has since been removed. Pt has no complaints of recent illnesses.

## 2024-01-08 NOTE — ASSESSMENT
[Patient Optimized for Surgery] : Patient optimized for surgery [No Further Testing Recommended] : no further testing recommended [Modify anticoagulant treatment prior to procedure] : Modify anticoagulant treatment prior to procedure [Modify anti-platelet treatment prior to procedure] : Modify anti-platelet treatment prior to procedure [Modify medications prior to procedure] : Modify medications prior to procedure [As per surgery] : as per surgery [FreeTextEntry4] : A 73-year-old male presents today for a re-op visit regarding an Endoscopy on 1/23/24 with Dr. Butler. Mood is good.  Pt is currently taking Allopurinol 300mg, Amlodipine 5mg, Aspirin 81mg, Eliquis 5mg, Flonase, Losartan 50mg, Methylprednisolone 40mg/ml, Metoprolol ER 100mg, Rosuvastatin 10mg, and Vitamin C.  No contraindications.  Clear fluid in nasal cavity.  [FreeTextEntry6] : Hold aspirin 3 days prior to op.

## 2024-01-30 ENCOUNTER — OFFICE (OUTPATIENT)
Dept: URBAN - METROPOLITAN AREA CLINIC 12 | Facility: CLINIC | Age: 74
Setting detail: OPHTHALMOLOGY
End: 2024-01-30
Payer: MEDICARE

## 2024-01-30 DIAGNOSIS — H43.393: ICD-10-CM

## 2024-01-30 DIAGNOSIS — H01.004: ICD-10-CM

## 2024-01-30 DIAGNOSIS — H01.001: ICD-10-CM

## 2024-01-30 DIAGNOSIS — H26.493: ICD-10-CM

## 2024-01-30 DIAGNOSIS — Z96.1: ICD-10-CM

## 2024-01-30 DIAGNOSIS — H35.3131: ICD-10-CM

## 2024-01-30 PROCEDURE — 92014 COMPRE OPH EXAM EST PT 1/>: CPT | Performed by: OPTOMETRIST

## 2024-01-30 PROCEDURE — 92250 FUNDUS PHOTOGRAPHY W/I&R: CPT | Performed by: OPTOMETRIST

## 2024-01-30 ASSESSMENT — REFRACTION_MANIFEST
OD_CYLINDER: -0.25
OS_CYLINDER: -0.25
OD_VA1: 20/20
OS_VA1: 20/25
OD_SPHERE: +2.50
OD_CYLINDER: 0.00
OS_AXIS: 025
OS_CYLINDER: -0.25
OS_SPHERE: +2.00
OS_SPHERE: +2.25
OS_VA1: 20/20
OD_AXIS: 100
OS_AXIS: 030
OD_VA1: 20/20
OD_SPHERE: +2.25

## 2024-01-30 ASSESSMENT — SPHEQUIV_DERIVED
OS_SPHEQUIV: 0.375
OS_SPHEQUIV: 1.875
OD_SPHEQUIV: 2.125
OD_SPHEQUIV: 2.5
OS_SPHEQUIV: 2.125
OD_SPHEQUIV: 0.375

## 2024-01-30 ASSESSMENT — REFRACTION_AUTOREFRACTION
OD_SPHERE: +0.50
OD_AXIS: 088
OS_SPHERE: +0.75
OS_AXIS: 032
OD_CYLINDER: -0.25
OS_CYLINDER: -0.75

## 2024-01-30 ASSESSMENT — REFRACTION_CURRENTRX
OD_SPHERE: +2.50
OS_SPHERE: +2.50
OD_VPRISM_DIRECTION: SV
OD_OVR_VA: 20/
OS_VPRISM_DIRECTION: SV
OS_OVR_VA: 20/
OD_ADD: +2.50
OD_OVR_VA: 20/
OS_OVR_VA: 20/
OD_VPRISM_DIRECTION: SV
OS_ADD: +2.50
OS_VPRISM_DIRECTION: SV

## 2024-01-30 ASSESSMENT — LID EXAM ASSESSMENTS
OD_BLEPHARITIS: 1+
OS_BLEPHARITIS: 1+

## 2024-01-30 ASSESSMENT — CONFRONTATIONAL VISUAL FIELD TEST (CVF)
OS_FINDINGS: FULL
OD_FINDINGS: FULL

## 2024-02-06 ENCOUNTER — APPOINTMENT (OUTPATIENT)
Dept: FAMILY MEDICINE | Facility: CLINIC | Age: 74
End: 2024-02-06

## 2024-02-16 ENCOUNTER — OFFICE (OUTPATIENT)
Dept: URBAN - METROPOLITAN AREA CLINIC 12 | Facility: CLINIC | Age: 74
Setting detail: OPHTHALMOLOGY
End: 2024-02-16
Payer: MEDICARE

## 2024-02-16 DIAGNOSIS — H43.393: ICD-10-CM

## 2024-02-16 DIAGNOSIS — H01.001: ICD-10-CM

## 2024-02-16 DIAGNOSIS — Z96.1: ICD-10-CM

## 2024-02-16 DIAGNOSIS — H35.3131: ICD-10-CM

## 2024-02-16 DIAGNOSIS — H26.493: ICD-10-CM

## 2024-02-16 DIAGNOSIS — H01.004: ICD-10-CM

## 2024-02-16 PROCEDURE — 92014 COMPRE OPH EXAM EST PT 1/>: CPT | Performed by: OPHTHALMOLOGY

## 2024-02-16 ASSESSMENT — REFRACTION_MANIFEST
OS_AXIS: 025
OS_VA1: 20/20
OD_VA1: 20/20
OS_CYLINDER: -0.25
OS_SPHERE: +2.25
OD_SPHERE: +2.25
OD_CYLINDER: -0.25
OD_VA1: 20/20
OS_CYLINDER: -0.25
OS_AXIS: 030
OD_CYLINDER: 0.00
OS_VA1: 20/25
OD_SPHERE: +2.50
OS_SPHERE: +2.00
OD_AXIS: 100

## 2024-02-16 ASSESSMENT — CONFRONTATIONAL VISUAL FIELD TEST (CVF)
OD_FINDINGS: FULL
OS_FINDINGS: FULL

## 2024-02-16 ASSESSMENT — SPHEQUIV_DERIVED
OD_SPHEQUIV: 2.125
OD_SPHEQUIV: 0.375
OS_SPHEQUIV: 2.125
OS_SPHEQUIV: 0.125
OS_SPHEQUIV: 1.875
OD_SPHEQUIV: 2.5

## 2024-02-16 ASSESSMENT — REFRACTION_AUTOREFRACTION
OD_CYLINDER: -0.25
OD_AXIS: 97
OD_SPHERE: +0.50
OS_AXIS: 1
OS_CYLINDER: -0.75
OS_SPHERE: +0.50

## 2024-02-16 ASSESSMENT — REFRACTION_CURRENTRX
OS_OVR_VA: 20/
OD_VPRISM_DIRECTION: SV
OS_VPRISM_DIRECTION: SV
OD_ADD: +2.50
OS_SPHERE: +2.50
OS_ADD: +2.50
OS_OVR_VA: 20/
OD_OVR_VA: 20/
OD_SPHERE: +2.50
OD_OVR_VA: 20/
OD_VPRISM_DIRECTION: SV
OS_VPRISM_DIRECTION: SV

## 2024-02-16 ASSESSMENT — LID EXAM ASSESSMENTS
OS_BLEPHARITIS: 1+
OD_BLEPHARITIS: 1+

## 2024-03-01 ENCOUNTER — ASC (OUTPATIENT)
Dept: URBAN - METROPOLITAN AREA SURGERY 8 | Facility: SURGERY | Age: 74
Setting detail: OPHTHALMOLOGY
End: 2024-03-01
Payer: MEDICARE

## 2024-03-01 DIAGNOSIS — H26.492: ICD-10-CM

## 2024-03-01 PROCEDURE — 66821 AFTER CATARACT LASER SURGERY: CPT | Mod: LT | Performed by: OPHTHALMOLOGY

## 2024-03-03 ENCOUNTER — RX ONLY (RX ONLY)
Age: 74
End: 2024-03-03

## 2024-03-03 ASSESSMENT — REFRACTION_MANIFEST
OS_VA1: 20/25
OS_SPHERE: +2.00
OD_CYLINDER: 0.00
OS_AXIS: 030
OD_VA1: 20/20
OD_VA1: 20/20
OD_CYLINDER: -0.25
OD_AXIS: 100
OS_VA1: 20/20
OS_SPHERE: +2.25
OD_SPHERE: +2.25
OD_SPHERE: +2.50
OS_AXIS: 025
OS_CYLINDER: -0.25
OS_CYLINDER: -0.25

## 2024-03-03 ASSESSMENT — REFRACTION_CURRENTRX
OS_OVR_VA: 20/
OD_OVR_VA: 20/
OS_OVR_VA: 20/
OS_ADD: +2.50
OD_VPRISM_DIRECTION: SV
OS_VPRISM_DIRECTION: SV
OS_VPRISM_DIRECTION: SV
OS_SPHERE: +2.50
OD_OVR_VA: 20/
OD_SPHERE: +2.50
OD_ADD: +2.50
OD_VPRISM_DIRECTION: SV

## 2024-03-03 ASSESSMENT — SPHEQUIV_DERIVED
OD_SPHEQUIV: 2.5
OD_SPHEQUIV: 2.125
OS_SPHEQUIV: 2.125
OS_SPHEQUIV: 1.875

## 2024-03-04 ENCOUNTER — APPOINTMENT (OUTPATIENT)
Dept: ORTHOPEDIC SURGERY | Facility: CLINIC | Age: 74
End: 2024-03-04
Payer: MEDICARE

## 2024-03-04 VITALS — BODY MASS INDEX: 29.62 KG/M2 | WEIGHT: 200 LBS | HEIGHT: 69 IN

## 2024-03-04 DIAGNOSIS — Z96.611 PRESENCE OF RIGHT ARTIFICIAL SHOULDER JOINT: ICD-10-CM

## 2024-03-04 PROCEDURE — 73030 X-RAY EXAM OF SHOULDER: CPT | Mod: RT

## 2024-03-04 PROCEDURE — 99213 OFFICE O/P EST LOW 20 MIN: CPT

## 2024-03-04 NOTE — DISCUSSION/SUMMARY
[de-identified] : I had a discussion with the patient regarding the operative and postoperative course. The patient is doing well. He should continue with his current plan of care. The patient was given home exercises today in the office. Patient will follow up in 1 year for repeat clinical assessment. All questions were answered and the patient verbalized understanding. The patient is in agreement with this treatment plan.

## 2024-03-04 NOTE — ADDENDUM
[FreeTextEntry1] : Documented by Isabell Vasquez acting as a scribe for Dr. Serrato on 03/04/2024. All medical record entries made by the Scribe were at my, Dr. Serrato's, direction and personally dictated by me on 03/04/2024. I have reviewed the chart and agree that the record accurately reflects my personal performance of the history, physical exam, procedure and imaging.

## 2024-03-04 NOTE — HISTORY OF PRESENT ILLNESS
[de-identified] : SHAUN is a 73 year male here today for spo R reverse shoulder replacement.. DOS: 2/15/2023. He is doing well at this time. He has occasional sharp, shooting pains originating in his biceps. He notes some minor difficulty with ADLs.

## 2024-03-04 NOTE — PHYSICAL EXAM
[de-identified] : General: Well appearing, no acute distress Neurologic: A&Ox3, No focal deficits Head: NCAT without abrasions, lacerations, or ecchymosis to head, face, or scalp Eyes: No scleral icterus, no gross abnormalities Respiratory: Equal chest wall expansion bilaterally, no accessory muscle use Lymphatic: No lymphadenopathy palpated Skin: Warm and dry Psychiatric: Normal mood and affect   Right Shoulder:  Inspection/Palpation: no tenderness, swelling or deformities  Range of Motion: no crepitus; FF 0-150; ER at side 35; IR to T7  Strength: forward elevation in scapular plane [5]/5, internal rotation [5]/5, external rotation [5]/5, adduction [5]/5 and abduction [5]/5  Stability: load and shift test negative, apprehension test negative, relocation test negative, sulcus sign negative, Alka test negative, Jerk test negative  Tests: Dahl negative, Neer's test's test negative, drop arm test negative, bear hug test negative, Fenton sign negative, cross arm adduction negative, lift off sign negative, Hornblower's sign negative, speeds test negative, Yergason's test negative, bicipital groove tenderness negative, Pires's Active Compression test negative, whipple test negative, bicep's load II test negative [de-identified] : The following radiographs were ordered and read by me during this patient's visit. I reviewed each radiograph in detail with the patient and discussed the findings as highlighted below. 2 views of the right shoulder were obtained today that show no fracture, dislocation. Hardware is in place and intact.

## 2024-03-11 ENCOUNTER — APPOINTMENT (OUTPATIENT)
Dept: ORTHOPEDIC SURGERY | Facility: CLINIC | Age: 74
End: 2024-03-11

## 2024-03-15 ENCOUNTER — ASC (OUTPATIENT)
Dept: URBAN - METROPOLITAN AREA SURGERY 8 | Facility: SURGERY | Age: 74
Setting detail: OPHTHALMOLOGY
End: 2024-03-15
Payer: MEDICARE

## 2024-03-15 DIAGNOSIS — H26.491: ICD-10-CM

## 2024-03-15 PROCEDURE — 66821 AFTER CATARACT LASER SURGERY: CPT | Mod: 79,RT | Performed by: OPHTHALMOLOGY

## 2024-03-16 PROBLEM — H26.491 POSTERIOR CAPSULAR OPACIFICATION; RIGHT EYE: Status: ACTIVE | Noted: 2024-03-15

## 2024-03-16 ASSESSMENT — REFRACTION_CURRENTRX
OS_SPHERE: +2.50
OS_ADD: +2.50
OS_VPRISM_DIRECTION: SV
OD_SPHERE: +2.50
OD_VPRISM_DIRECTION: SV
OD_OVR_VA: 20/
OS_VPRISM_DIRECTION: SV
OD_ADD: +2.50
OD_OVR_VA: 20/
OS_OVR_VA: 20/
OD_VPRISM_DIRECTION: SV
OS_OVR_VA: 20/

## 2024-03-16 ASSESSMENT — SPHEQUIV_DERIVED
OS_SPHEQUIV: 1.875
OD_SPHEQUIV: 2.5
OD_SPHEQUIV: 2.125
OS_SPHEQUIV: 2.125

## 2024-03-16 ASSESSMENT — REFRACTION_MANIFEST
OS_SPHERE: +2.00
OS_VA1: 20/25
OD_AXIS: 100
OS_CYLINDER: -0.25
OS_VA1: 20/20
OS_AXIS: 025
OS_SPHERE: +2.25
OD_VA1: 20/20
OD_SPHERE: +2.25
OD_CYLINDER: 0.00
OS_CYLINDER: -0.25
OD_CYLINDER: -0.25
OS_AXIS: 030
OD_SPHERE: +2.50
OD_VA1: 20/20

## 2024-04-05 ENCOUNTER — OFFICE (OUTPATIENT)
Dept: URBAN - METROPOLITAN AREA CLINIC 12 | Facility: CLINIC | Age: 74
Setting detail: OPHTHALMOLOGY
End: 2024-04-05
Payer: MEDICARE

## 2024-04-05 DIAGNOSIS — H26.491: ICD-10-CM

## 2024-04-05 PROCEDURE — 99024 POSTOP FOLLOW-UP VISIT: CPT | Performed by: OPTOMETRIST

## 2024-04-05 ASSESSMENT — LID EXAM ASSESSMENTS
OS_BLEPHARITIS: 1+
OD_BLEPHARITIS: 1+

## 2024-06-05 ENCOUNTER — RX RENEWAL (OUTPATIENT)
Age: 74
End: 2024-06-05

## 2024-06-05 RX ORDER — LOSARTAN POTASSIUM 50 MG/1
50 TABLET, FILM COATED ORAL
Qty: 90 | Refills: 1 | Status: ACTIVE | COMMUNITY
Start: 2021-06-15 | End: 1900-01-01

## 2024-06-05 RX ORDER — ROSUVASTATIN CALCIUM 10 MG/1
10 TABLET, FILM COATED ORAL
Qty: 90 | Refills: 3 | Status: ACTIVE | COMMUNITY
Start: 2021-04-05 | End: 1900-01-01

## 2024-07-08 ENCOUNTER — APPOINTMENT (OUTPATIENT)
Dept: FAMILY MEDICINE | Facility: CLINIC | Age: 74
End: 2024-07-08
Payer: MEDICARE

## 2024-07-08 VITALS
SYSTOLIC BLOOD PRESSURE: 122 MMHG | HEIGHT: 69 IN | HEART RATE: 59 BPM | DIASTOLIC BLOOD PRESSURE: 70 MMHG | BODY MASS INDEX: 29.62 KG/M2 | WEIGHT: 200 LBS | TEMPERATURE: 97 F | OXYGEN SATURATION: 97 %

## 2024-07-08 VITALS — SYSTOLIC BLOOD PRESSURE: 130 MMHG | DIASTOLIC BLOOD PRESSURE: 80 MMHG

## 2024-07-08 DIAGNOSIS — I10 ESSENTIAL (PRIMARY) HYPERTENSION: ICD-10-CM

## 2024-07-08 DIAGNOSIS — R73.9 HYPERGLYCEMIA, UNSPECIFIED: ICD-10-CM

## 2024-07-08 DIAGNOSIS — D50.9 IRON DEFICIENCY ANEMIA, UNSPECIFIED: ICD-10-CM

## 2024-07-08 DIAGNOSIS — K21.9 GASTRO-ESOPHAGEAL REFLUX DISEASE W/OUT ESOPHAGITIS: ICD-10-CM

## 2024-07-08 DIAGNOSIS — E78.5 HYPERLIPIDEMIA, UNSPECIFIED: ICD-10-CM

## 2024-07-08 LAB
CHOLEST SERPL-MCNC: 107
HBA1C MFR BLD HPLC: 5.5
HDLC SERPL-MCNC: 43
LDLC SERPL DIRECT ASSAY-MCNC: 49

## 2024-07-08 PROCEDURE — 99214 OFFICE O/P EST MOD 30 MIN: CPT

## 2024-07-08 PROCEDURE — G2211 COMPLEX E/M VISIT ADD ON: CPT

## 2024-09-09 ENCOUNTER — RX RENEWAL (OUTPATIENT)
Age: 74
End: 2024-09-09

## 2024-09-13 RX ORDER — DICLOFENAC SODIUM 10 MG/G
1 GEL TOPICAL
Qty: 3 | Refills: 0 | Status: ACTIVE | COMMUNITY
Start: 2024-09-13 | End: 1900-01-01

## 2024-10-07 ENCOUNTER — OFFICE (OUTPATIENT)
Dept: URBAN - METROPOLITAN AREA CLINIC 12 | Facility: CLINIC | Age: 74
Setting detail: OPHTHALMOLOGY
End: 2024-10-07
Payer: MEDICARE

## 2024-10-07 DIAGNOSIS — H01.004: ICD-10-CM

## 2024-10-07 DIAGNOSIS — H35.3131: ICD-10-CM

## 2024-10-07 DIAGNOSIS — H43.393: ICD-10-CM

## 2024-10-07 DIAGNOSIS — H01.001: ICD-10-CM

## 2024-10-07 PROCEDURE — 92014 COMPRE OPH EXAM EST PT 1/>: CPT | Performed by: OPTOMETRIST

## 2024-10-07 ASSESSMENT — REFRACTION_MANIFEST
OD_SPHERE: +2.25
OS_CYLINDER: -0.25
OD_AXIS: 100
OS_CYLINDER: -0.25
OS_AXIS: 010
OS_VA1: 20/25
OD_CYLINDER: -0.25
OD_AXIS: 100
OD_VA1: 20/20
OS_SPHERE: +2.50
OS_AXIS: 030
OD_CYLINDER: -0.25
OD_SPHERE: +2.50
OD_VA1: 20/20
OS_VA1: 20/20
OS_SPHERE: +2.25

## 2024-10-07 ASSESSMENT — KERATOMETRY
OD_AXISANGLE_DEGREES: 065
OS_AXISANGLE_DEGREES: 098
OD_K1POWER_DIOPTERS: 42.75
OS_K2POWER_DIOPTERS: 44.00
OD_K2POWER_DIOPTERS: 43.00
OS_K1POWER_DIOPTERS: 43.00
METHOD_AUTO_MANUAL: AUTO

## 2024-10-07 ASSESSMENT — TONOMETRY
OD_IOP_MMHG: 13
OS_IOP_MMHG: 14

## 2024-10-07 ASSESSMENT — REFRACTION_CURRENTRX
OS_VPRISM_DIRECTION: SV
OS_OVR_VA: 20/
OS_ADD: +2.50
OD_VPRISM_DIRECTION: SV
OS_SPHERE: +2.50
OD_ADD: +2.50
OD_SPHERE: +2.50
OD_VPRISM_DIRECTION: SV
OS_VPRISM_DIRECTION: SV
OS_OVR_VA: 20/
OD_OVR_VA: 20/
OD_OVR_VA: 20/

## 2024-10-07 ASSESSMENT — CONFRONTATIONAL VISUAL FIELD TEST (CVF)
OS_FINDINGS: FULL
OD_FINDINGS: FULL

## 2024-10-07 ASSESSMENT — REFRACTION_AUTOREFRACTION
OS_SPHERE: +0.75
OD_CYLINDER: -0.50
OD_AXIS: 105
OD_SPHERE: +0.75
OS_CYLINDER: -0.75
OS_AXIS: 016

## 2024-10-07 ASSESSMENT — LID EXAM ASSESSMENTS
OD_BLEPHARITIS: 1+
OS_BLEPHARITIS: 1+

## 2024-10-07 ASSESSMENT — VISUAL ACUITY
OS_BCVA: 20/20
OD_BCVA: 20/20

## 2024-10-24 ENCOUNTER — APPOINTMENT (OUTPATIENT)
Dept: FAMILY MEDICINE | Facility: CLINIC | Age: 74
End: 2024-10-24
Payer: MEDICARE

## 2024-10-24 PROCEDURE — G0008: CPT

## 2024-10-24 PROCEDURE — 90662 IIV NO PRSV INCREASED AG IM: CPT

## 2024-12-03 RX ORDER — AMOXICILLIN 500 MG/1
500 TABLET, FILM COATED ORAL
Qty: 4 | Refills: 2 | Status: ACTIVE | COMMUNITY
Start: 2024-12-03 | End: 1900-01-01

## 2024-12-20 ENCOUNTER — RX RENEWAL (OUTPATIENT)
Age: 74
End: 2024-12-20

## 2025-01-14 ENCOUNTER — APPOINTMENT (OUTPATIENT)
Dept: FAMILY MEDICINE | Facility: CLINIC | Age: 75
End: 2025-01-14

## 2025-03-06 ENCOUNTER — RX RENEWAL (OUTPATIENT)
Age: 75
End: 2025-03-06

## 2025-04-01 ENCOUNTER — OFFICE (OUTPATIENT)
Dept: URBAN - METROPOLITAN AREA CLINIC 12 | Facility: CLINIC | Age: 75
Setting detail: OPHTHALMOLOGY
End: 2025-04-01
Payer: MEDICARE

## 2025-04-01 DIAGNOSIS — H01.001: ICD-10-CM

## 2025-04-01 DIAGNOSIS — H43.393: ICD-10-CM

## 2025-04-01 DIAGNOSIS — H01.004: ICD-10-CM

## 2025-04-01 DIAGNOSIS — H35.3131: ICD-10-CM

## 2025-04-01 PROCEDURE — 92014 COMPRE OPH EXAM EST PT 1/>: CPT | Performed by: OPTOMETRIST

## 2025-04-01 PROCEDURE — 92134 CPTRZ OPH DX IMG PST SGM RTA: CPT | Performed by: OPTOMETRIST

## 2025-04-01 ASSESSMENT — REFRACTION_MANIFEST
OD_VA1: 20/20
OD_SPHERE: +2.25
OS_VA1: 20/25
OS_ADD: +2.50
OS_AXIS: 010
OS_CYLINDER: -0.25
OS_AXIS: 030
OD_SPHERE: PLANO
OD_AXIS: 100
OD_CYLINDER: -0.25
OD_AXIS: 100
OS_SPHERE: +2.25
OD_VA1: 20/20
OD_CYLINDER: -0.25
OS_VA1: 20/20
OD_ADD: +2.50
OS_SPHERE: PLANO
OS_CYLINDER: -0.25

## 2025-04-01 ASSESSMENT — REFRACTION_AUTOREFRACTION
OS_SPHERE: +0.50
OD_AXIS: 100
OS_CYLINDER: -0.25
OD_SPHERE: +0.75
OS_AXIS: 169
OD_CYLINDER: -0.50

## 2025-04-01 ASSESSMENT — KERATOMETRY
OD_K1POWER_DIOPTERS: 42.75
OS_K1POWER_DIOPTERS: 43.00
METHOD_AUTO_MANUAL: AUTO
OS_K2POWER_DIOPTERS: 44.25
OS_AXISANGLE_DEGREES: 092
OD_K2POWER_DIOPTERS: 43.25
OD_AXISANGLE_DEGREES: 065

## 2025-04-01 ASSESSMENT — REFRACTION_CURRENTRX
OD_OVR_VA: 20/
OD_VPRISM_DIRECTION: SV
OD_VPRISM_DIRECTION: SV
OD_SPHERE: +2.50
OD_OVR_VA: 20/
OS_VPRISM_DIRECTION: SV
OS_OVR_VA: 20/
OS_VPRISM_DIRECTION: SV
OD_ADD: +2.50
OS_ADD: +2.50
OS_SPHERE: +2.50
OS_OVR_VA: 20/

## 2025-04-01 ASSESSMENT — LID EXAM ASSESSMENTS
OD_BLEPHARITIS: 1+
OS_BLEPHARITIS: 1+

## 2025-04-01 ASSESSMENT — TONOMETRY
OD_IOP_MMHG: 14
OS_IOP_MMHG: 13

## 2025-04-01 ASSESSMENT — CONFRONTATIONAL VISUAL FIELD TEST (CVF)
OD_FINDINGS: FULL
OS_FINDINGS: FULL

## 2025-04-01 ASSESSMENT — VISUAL ACUITY
OS_BCVA: 20/20
OD_BCVA: 20/20